# Patient Record
Sex: FEMALE | Race: WHITE | NOT HISPANIC OR LATINO | Employment: FULL TIME | URBAN - METROPOLITAN AREA
[De-identification: names, ages, dates, MRNs, and addresses within clinical notes are randomized per-mention and may not be internally consistent; named-entity substitution may affect disease eponyms.]

---

## 2024-02-05 ENCOUNTER — APPOINTMENT (OUTPATIENT)
Dept: OBSTETRICS AND GYNECOLOGY | Facility: CLINIC | Age: 24
End: 2024-02-05
Payer: MEDICAID

## 2024-02-19 ENCOUNTER — TELEMEDICINE (OUTPATIENT)
Dept: OBSTETRICS AND GYNECOLOGY | Facility: CLINIC | Age: 24
End: 2024-02-19
Payer: MEDICAID

## 2024-02-19 DIAGNOSIS — Z30.018 ENCOUNTER FOR INITIAL PRESCRIPTION OF OTHER CONTRACEPTIVES: Primary | ICD-10-CM

## 2024-02-19 PROCEDURE — 99213 OFFICE O/P EST LOW 20 MIN: CPT | Performed by: OBSTETRICS & GYNECOLOGY

## 2024-02-19 NOTE — PROGRESS NOTES
Subjective   Patient ID: Mayra Coronado is a 23 y.o. female who presents for contraception    23-year-old female presents to discuss contraception.  Patient had previously been on OCP but was having some issues remembering to take it so she wanted to switch to something a little bit more effective.  Patient was considering IUD and wanted to know a little bit more information about it.  She is in stable relationship for a number of years and does not want children for a number of years.        Review of Systems   Constitutional:  Negative for chills, diaphoresis and fever.   Genitourinary:  Negative for menstrual problem, pelvic pain and vaginal bleeding.       Objective   Physical Exam  Constitutional:       Appearance: Normal appearance.   Neurological:      Mental Status: She is alert.         Assessment/Plan   Problem List Items Addressed This Visit    None  Visit Diagnoses         Codes    Encounter for initial prescription of other contraceptives    -  Primary Z30.018          Desires Kyleena: I discussed that IUDs have an unintended pregnancy rate of approximately 1%, similar to permanent sterilization. Mechanism of action and duration of therapy was discussed. Expected side effects of pain and irregular spotting for the first 3-6 months were reviewed. We talked about risks such as expulsion, migration, perforation, and PID with untreated gonorrhea/chlamydia at the time of insertion. STI testing prior to IUD insertion was offered. Safe sexual practices were encouraged. All questions were answered.    --Patient will need Cytotec preprocedurally, placed when patient is on menses day 5 through 7 with negative UPT.    Aarti Kebede MD 02/19/24 11:55 AM

## 2024-02-22 ASSESSMENT — ENCOUNTER SYMPTOMS
DIAPHORESIS: 0
FEVER: 0
CHILLS: 0

## 2024-06-17 ENCOUNTER — OFFICE VISIT (OUTPATIENT)
Dept: OBSTETRICS AND GYNECOLOGY | Facility: CLINIC | Age: 24
End: 2024-06-17
Payer: MEDICAID

## 2024-06-17 VITALS
DIASTOLIC BLOOD PRESSURE: 59 MMHG | HEIGHT: 64 IN | BODY MASS INDEX: 29.53 KG/M2 | WEIGHT: 173 LBS | SYSTOLIC BLOOD PRESSURE: 91 MMHG

## 2024-06-17 DIAGNOSIS — Z72.51 HIGH RISK HETEROSEXUAL BEHAVIOR: ICD-10-CM

## 2024-06-17 DIAGNOSIS — N93.0 POSTCOITAL BLEEDING: ICD-10-CM

## 2024-06-17 DIAGNOSIS — Z01.419 ENCOUNTER FOR ANNUAL ROUTINE GYNECOLOGICAL EXAMINATION: Primary | ICD-10-CM

## 2024-06-17 DIAGNOSIS — Z11.3 SCREEN FOR SEXUALLY TRANSMITTED DISEASES: ICD-10-CM

## 2024-06-17 PROCEDURE — 1036F TOBACCO NON-USER: CPT | Performed by: OBSTETRICS & GYNECOLOGY

## 2024-06-17 PROCEDURE — 87491 CHLMYD TRACH DNA AMP PROBE: CPT | Performed by: OBSTETRICS & GYNECOLOGY

## 2024-06-17 PROCEDURE — 99395 PREV VISIT EST AGE 18-39: CPT | Performed by: OBSTETRICS & GYNECOLOGY

## 2024-06-17 RX ORDER — HYDROXYZINE HYDROCHLORIDE 10 MG/1
TABLET, FILM COATED ORAL
COMMUNITY
Start: 2024-05-01

## 2024-06-17 RX ORDER — TRAZODONE HYDROCHLORIDE 50 MG/1
5 TABLET ORAL NIGHTLY PRN
COMMUNITY
Start: 2024-04-01

## 2024-06-17 RX ORDER — DULOXETIN HYDROCHLORIDE 60 MG/1
60 CAPSULE, DELAYED RELEASE ORAL DAILY
COMMUNITY

## 2024-06-17 ASSESSMENT — LIFESTYLE VARIABLES
HOW OFTEN DO YOU HAVE A DRINK CONTAINING ALCOHOL: MONTHLY OR LESS
HOW MANY STANDARD DRINKS CONTAINING ALCOHOL DO YOU HAVE ON A TYPICAL DAY: 1 OR 2
SKIP TO QUESTIONS 9-10: 1
HOW OFTEN DO YOU HAVE SIX OR MORE DRINKS ON ONE OCCASION: NEVER
AUDIT-C TOTAL SCORE: 1

## 2024-06-17 ASSESSMENT — ENCOUNTER SYMPTOMS
OCCASIONAL FEELINGS OF UNSTEADINESS: 0
LOSS OF SENSATION IN FEET: 0
ABDOMINAL PAIN: 0
VOMITING: 0
NAUSEA: 0
DYSURIA: 0
CHILLS: 0
DIZZINESS: 0
DEPRESSION: 0
COUGH: 0
COLOR CHANGE: 0
UNEXPECTED WEIGHT CHANGE: 0
SHORTNESS OF BREATH: 0
FATIGUE: 0
HEADACHES: 0
FEVER: 0

## 2024-06-17 ASSESSMENT — PATIENT HEALTH QUESTIONNAIRE - PHQ9
1. LITTLE INTEREST OR PLEASURE IN DOING THINGS: NOT AT ALL
2. FEELING DOWN, DEPRESSED OR HOPELESS: NOT AT ALL
SUM OF ALL RESPONSES TO PHQ9 QUESTIONS 1 & 2: 0

## 2024-06-17 ASSESSMENT — PAIN SCALES - GENERAL: PAINLEVEL: 0-NO PAIN

## 2024-06-17 NOTE — PROGRESS NOTES
"Annual  Subjective   Mayra Coronado is a 24 y.o. female who is here for a routine exam.     Complaints:   getting some spotting after intercouse, random times, has new partner  Periods: regular but w/some spotting   Dysmenorrhea:  none    Current contraception: condoms  History of abnormal Pap smear: no  History of abnormal mammogram: no      OB History          0    Para   0    Term   0       0    AB   0    Living   0         SAB   0    IAB   0    Ectopic   0    Multiple   0    Live Births   0                  Review of Systems   Constitutional:  Negative for chills, fatigue, fever and unexpected weight change.   Respiratory:  Negative for cough and shortness of breath.    Gastrointestinal:  Negative for abdominal pain, nausea and vomiting.   Genitourinary:  Positive for menstrual problem. Negative for dyspareunia, dysuria, pelvic pain and vaginal discharge.   Skin:  Negative for color change and rash.   Neurological:  Negative for dizziness and headaches.       Objective   BP 91/59   Ht 1.626 m (5' 4\")   Wt 78.5 kg (173 lb)   LMP 2024   BMI 29.70 kg/m²        General:   Alert and oriented, in no acute distress   Neck: Supple. No visible thyromegaly.    Breast/Axilla: Normal to palpation bilaterally without masses, skin changes, or nipple discharge.    Abdomen: Soft, non-tender, without masses or organomegaly   Vulva: Normal architecture without erythema, masses, or lesions.    Vagina: Normal mucosa without lesions, masses, or atrophy. No abnormal vaginal discharge.    Cervix: Normal without masses, lesions, or signs of cervicitis   Uterus: Normal, mobile, non-enlarged uterus   Adnexa: Normal without masses or lesions   Pelvic Floor normal   Psych Normal affect. Normal mood.      Assessment/Plan   Problem List Items Addressed This Visit    None  Visit Diagnoses         Codes    Encounter for annual routine gynecological examination    -  Primary Z01.419    Relevant Orders    THINPREP PAP    " Postcoital bleeding     N93.0    Relevant Orders    THINPREP PAP    Screen for sexually transmitted diseases     Z11.3    Relevant Orders    THINPREP PAP    High risk heterosexual behavior     Z72.51    Relevant Orders    THINPREP PAP            Routine annual    Pap due 2026 but having some postcoital spotting      Aarti Kebede MD

## 2024-06-19 LAB
C TRACH RRNA SPEC QL NAA+PROBE: NEGATIVE
N GONORRHOEA DNA SPEC QL PROBE+SIG AMP: NEGATIVE

## 2024-06-27 LAB
CYTOLOGY CMNT CVX/VAG CYTO-IMP: NORMAL
LAB AP HPV GENOTYPE QUESTION: YES
LAB AP HPV HR: NORMAL
LAB AP PAP ADDITIONAL TESTS: NORMAL
LABORATORY COMMENT REPORT: NORMAL
LABORATORY COMMENT REPORT: NORMAL
LMP START DATE: NORMAL
PATH REPORT.TOTAL CANCER: NORMAL

## 2024-12-09 ENCOUNTER — TELEPHONE (OUTPATIENT)
Dept: OBSTETRICS AND GYNECOLOGY | Facility: CLINIC | Age: 24
End: 2024-12-09
Payer: MEDICAID

## 2024-12-09 DIAGNOSIS — O26.859 SPOTTING IN PREGNANCY (HHS-HCC): Primary | ICD-10-CM

## 2024-12-09 NOTE — TELEPHONE ENCOUNTER
Est pt around 5wks LMP 11/04 calling with c/o red spotting x1wk. Denies cramping or pain. Ok to do some labs?

## 2024-12-10 ENCOUNTER — LAB (OUTPATIENT)
Dept: LAB | Facility: LAB | Age: 24
End: 2024-12-10
Payer: MEDICAID

## 2024-12-10 DIAGNOSIS — O26.859 SPOTTING IN PREGNANCY (HHS-HCC): ICD-10-CM

## 2024-12-10 LAB
ABO GROUP (TYPE) IN BLOOD: NORMAL
B-HCG SERPL-ACNC: ABNORMAL MIU/ML
RH FACTOR (ANTIGEN D): NORMAL

## 2024-12-10 PROCEDURE — 84702 CHORIONIC GONADOTROPIN TEST: CPT

## 2024-12-10 PROCEDURE — 36415 COLL VENOUS BLD VENIPUNCTURE: CPT

## 2024-12-10 PROCEDURE — 86901 BLOOD TYPING SEROLOGIC RH(D): CPT

## 2024-12-10 PROCEDURE — 86900 BLOOD TYPING SEROLOGIC ABO: CPT

## 2024-12-17 DIAGNOSIS — O26.859 SPOTTING IN PREGNANCY (HHS-HCC): Primary | ICD-10-CM

## 2024-12-19 ENCOUNTER — HOSPITAL ENCOUNTER (OUTPATIENT)
Dept: RADIOLOGY | Facility: HOSPITAL | Age: 24
Discharge: HOME | End: 2024-12-19
Payer: MEDICAID

## 2024-12-19 DIAGNOSIS — O26.859 SPOTTING IN PREGNANCY (HHS-HCC): ICD-10-CM

## 2024-12-19 PROCEDURE — 76817 TRANSVAGINAL US OBSTETRIC: CPT

## 2025-01-08 ENCOUNTER — INITIAL PRENATAL (OUTPATIENT)
Dept: OBSTETRICS AND GYNECOLOGY | Facility: CLINIC | Age: 25
End: 2025-01-08
Payer: MEDICAID

## 2025-01-08 VITALS
WEIGHT: 158.8 LBS | BODY MASS INDEX: 27.11 KG/M2 | DIASTOLIC BLOOD PRESSURE: 82 MMHG | SYSTOLIC BLOOD PRESSURE: 122 MMHG | OXYGEN SATURATION: 99 % | HEART RATE: 104 BPM | HEIGHT: 64 IN

## 2025-01-08 DIAGNOSIS — Z82.79 FAMILY HISTORY OF CONGENITAL HEART DEFECT: ICD-10-CM

## 2025-01-08 DIAGNOSIS — O36.80X0 PREGNANCY WITH INCONCLUSIVE FETAL VIABILITY, SINGLE OR UNSPECIFIED FETUS: ICD-10-CM

## 2025-01-08 DIAGNOSIS — F12.11 TETRAHYDROCANNABINOL (THC) USE DISORDER, MILD, IN EARLY REMISSION, ABUSE: ICD-10-CM

## 2025-01-08 DIAGNOSIS — Z34.90 PRENATAL CARE, ANTEPARTUM (HHS-HCC): ICD-10-CM

## 2025-01-08 DIAGNOSIS — Z23 ENCOUNTER FOR IMMUNIZATION: ICD-10-CM

## 2025-01-08 DIAGNOSIS — Z34.00 SUPERVISION OF NORMAL FIRST PREGNANCY, ANTEPARTUM (HHS-HCC): Primary | ICD-10-CM

## 2025-01-08 DIAGNOSIS — Z13.79 GENETIC TESTING: Primary | ICD-10-CM

## 2025-01-08 PROCEDURE — 99213 OFFICE O/P EST LOW 20 MIN: CPT | Mod: 25 | Performed by: OBSTETRICS & GYNECOLOGY

## 2025-01-08 PROCEDURE — 87086 URINE CULTURE/COLONY COUNT: CPT | Performed by: OBSTETRICS & GYNECOLOGY

## 2025-01-08 PROCEDURE — 99213 OFFICE O/P EST LOW 20 MIN: CPT | Performed by: OBSTETRICS & GYNECOLOGY

## 2025-01-08 PROCEDURE — 87491 CHLMYD TRACH DNA AMP PROBE: CPT | Performed by: OBSTETRICS & GYNECOLOGY

## 2025-01-08 PROCEDURE — 90656 IIV3 VACC NO PRSV 0.5 ML IM: CPT | Performed by: OBSTETRICS & GYNECOLOGY

## 2025-01-08 PROCEDURE — 81003 URINALYSIS AUTO W/O SCOPE: CPT | Performed by: OBSTETRICS & GYNECOLOGY

## 2025-01-08 ASSESSMENT — LIFESTYLE VARIABLES
SKIP TO QUESTIONS 9-10: 1
HOW OFTEN DO YOU HAVE A DRINK CONTAINING ALCOHOL: NEVER
HOW OFTEN DO YOU HAVE SIX OR MORE DRINKS ON ONE OCCASION: NEVER
AUDIT-C TOTAL SCORE: 0
HOW MANY STANDARD DRINKS CONTAINING ALCOHOL DO YOU HAVE ON A TYPICAL DAY: PATIENT DOES NOT DRINK

## 2025-01-08 ASSESSMENT — SOCIAL DETERMINANTS OF HEALTH (SDOH)
WITHIN THE LAST YEAR, HAVE YOU BEEN HUMILIATED OR EMOTIONALLY ABUSED IN OTHER WAYS BY YOUR PARTNER OR EX-PARTNER?: NO
WITHIN THE LAST YEAR, HAVE YOU BEEN KICKED, HIT, SLAPPED, OR OTHERWISE PHYSICALLY HURT BY YOUR PARTNER OR EX-PARTNER?: NO
WITHIN THE LAST YEAR, HAVE YOU BEEN AFRAID OF YOUR PARTNER OR EX-PARTNER?: NO
WITHIN THE LAST YEAR, HAVE TO BEEN RAPED OR FORCED TO HAVE ANY KIND OF SEXUAL ACTIVITY BY YOUR PARTNER OR EX-PARTNER?: NO

## 2025-01-08 ASSESSMENT — PATIENT HEALTH QUESTIONNAIRE - PHQ9
2. FEELING DOWN, DEPRESSED OR HOPELESS: NOT AT ALL
SUM OF ALL RESPONSES TO PHQ9 QUESTIONS 1 & 2: 0
1. LITTLE INTEREST OR PLEASURE IN DOING THINGS: NOT AT ALL

## 2025-01-08 ASSESSMENT — PAIN SCALES - GENERAL: PAINLEVEL_OUTOF10: 0 - NO PAIN

## 2025-01-08 ASSESSMENT — PAIN - FUNCTIONAL ASSESSMENT: PAIN_FUNCTIONAL_ASSESSMENT: 0-10

## 2025-01-08 ASSESSMENT — ENCOUNTER SYMPTOMS
DEPRESSION: 0
LOSS OF SENSATION IN FEET: 0
OCCASIONAL FEELINGS OF UNSTEADINESS: 0

## 2025-01-08 NOTE — PROGRESS NOTES
"Subjective   Patient ID [id number]   Mayra Coronado is a 24 y.o.  at 9w1d with a working estimated date of delivery of 2025, by Last Menstrual Period who presents for an initial prenatal visit.     Her pregnancy is complicated by:  FOB sister w/\"hole in heart\", fetal echo at 22wks    OB History    Para Term  AB Living   1 0 0 0 0 0   SAB IAB Ectopic Multiple Live Births   0 0 0 0 0      # Outcome Date GA Lbr Robert/2nd Weight Sex Type Anes PTL Lv   1 Current                   Objective   Physical Exam  Weight: 72 kg (158 lb 12.8 oz)  Pregravid BMI: Could not be calculated  BP: 122/82          PROCEDURE:  OB/GYN Transvaginal U/S    Intrauterine - yes  Yolk Sac - yes  Fetal Pole- single  FHT  yes  EDC  2025  CRL  9w3d  Impression: single live IUP, adequate growth since previous US     OBGyn Exam    General Physical Exam    HEENT: normal Heart: normal Skin: normal   Thyroid: normal Lungs: normal Extremities: normal   Lymph Nodes: normal Breasts: normal Neurological: normal   Abdomen: normal        Pelvic Exam    Vulva: normal Vagina: normal   Cervix: normal Adnexa: normal   Rectum: normal Spines: average   Subpubic Arch: normal       Prenatal Labs  Results for orders placed or performed in visit on 25   Urine Culture clinic collect    Collection Time: 25  3:03 PM    Specimen: Clean Catch/Voided; Urine   Result Value Ref Range    Urine Culture       Clinically insignificant growth based on current clinical standards.   Urine GC / Chlam clinic collect    Collection Time: 25  3:03 PM   Result Value Ref Range    Neisseria gonorrhea,Amplified Negative Negative    Chlamydia trachomatis, Amplified Negative Negative   POCT UA Automated manually resulted    Collection Time: 25  4:31 PM   Result Value Ref Range    POC Color, Urine Yellow Straw, Yellow, Light-Yellow    POC Appearance, Urine Clear Clear    POC Glucose, Urine NEGATIVE NEGATIVE mg/dl    POC Bilirubin, Urine NEGATIVE " NEGATIVE    POC Ketones, Urine NEGATIVE NEGATIVE mg/dl    POC Specific Gravity, Urine 1.025 1.005 - 1.035    POC Blood, Urine NEGATIVE NEGATIVE    POC PH, Urine 7.5 No Reference Range Established PH    POC Protein, Urine NEGATIVE NEGATIVE mg/dl    POC Urobilinogen, Urine 0.2 0.2, 1.0 EU/DL    Poc Nitrite, Urine NEGATIVE NEGATIVE    POC Leukocytes, Urine SMALL (1+) (A) NEGATIVE         Assessment/Plan   Problem List Items Addressed This Visit    None  Visit Diagnoses         Codes    Supervision of normal first pregnancy, antepartum ([facility])    -  Primary Z34.00    Genetic testing     Z13.79    Relevant Orders    [facility] P[facility]el Prenatal screen    Myriad Foresight Carrier Screen    Prenatal care, antepartum ([facility])     Z34.90    Relevant Orders    CBC Anemia Panel With Reflex,Pregnancy    Hepatitis B surface antigen    Hepatitis C antibody    HIV 1/2 Antigen/Antibody Screen with Reflex to Confirmation    Rubella Antibody, IgG    Syphilis Screen with Reflex    Hgb  A1C    Type And Screen    Urine Culture clinic collect (Completed)    Urine GC / Chlam clinic collect (Completed)    Varicella Zoster Antibody, IgG    US OB NT (NUCHAL translucency)    US MAC OB imaging order    POCT UA Automated manually resulted (Completed)    Encounter for immunization     Z23    Relevant Orders    Flu vaccine, trivalent, preservative free, age 6 months and greater (Fluarix/Fluzone/Flulaval) (Completed)    Pregnancy with inconclusive fetal viability, single or unspecified fetus     O36.80X0    Relevant Orders    Point of Care Ultrasound    Tetrahydrocannabinol (THC) use disorder, mild, in early remission, abuse     F12.11    Relevant Orders    Drug Screen, Urine With Reflex to Confirmation    Family history of congenital heart defect     Z82.79    Relevant Orders    Fetal Echo Complete            Immunizations: discussed, accepts flu vaccine  Prenatal Labs ordered  Daily prenatal vitamins prescribed  First trimester screening and  second trimester screening discussed.  Follow up in 4 weeks for return OB visit.

## 2025-01-09 LAB
C TRACH RRNA SPEC QL NAA+PROBE: NEGATIVE
N GONORRHOEA DNA SPEC QL PROBE+SIG AMP: NEGATIVE
POC APPEARANCE, URINE: CLEAR
POC BILIRUBIN, URINE: NEGATIVE
POC BLOOD, URINE: NEGATIVE
POC COLOR, URINE: YELLOW
POC GLUCOSE, URINE: NEGATIVE MG/DL
POC KETONES, URINE: NEGATIVE MG/DL
POC LEUKOCYTES, URINE: ABNORMAL
POC NITRITE,URINE: NEGATIVE
POC PH, URINE: 7.5 PH
POC PROTEIN, URINE: NEGATIVE MG/DL
POC SPECIFIC GRAVITY, URINE: 1.02
POC UROBILINOGEN, URINE: 0.2 EU/DL

## 2025-01-10 LAB — BACTERIA UR CULT: NORMAL

## 2025-01-14 ENCOUNTER — LAB (OUTPATIENT)
Dept: LAB | Facility: LAB | Age: 25
End: 2025-01-14
Payer: MEDICAID

## 2025-01-14 DIAGNOSIS — F12.11 TETRAHYDROCANNABINOL (THC) USE DISORDER, MILD, IN EARLY REMISSION, ABUSE: ICD-10-CM

## 2025-01-14 DIAGNOSIS — Z34.90 PRENATAL CARE, ANTEPARTUM (HHS-HCC): ICD-10-CM

## 2025-01-14 LAB
ABO GROUP (TYPE) IN BLOOD: NORMAL
AMPHETAMINES UR QL SCN: ABNORMAL
ANTIBODY SCREEN: NORMAL
BARBITURATES UR QL SCN: ABNORMAL
BENZODIAZ UR QL SCN: ABNORMAL
BZE UR QL SCN: ABNORMAL
CANNABINOIDS UR QL SCN: ABNORMAL
ERYTHROCYTE [DISTWIDTH] IN BLOOD BY AUTOMATED COUNT: 13.3 % (ref 11.5–14.5)
FENTANYL+NORFENTANYL UR QL SCN: ABNORMAL
HCT VFR BLD AUTO: 43.6 % (ref 36–46)
HGB BLD-MCNC: 14.6 G/DL (ref 12–16)
MCH RBC QN AUTO: 29.9 PG (ref 26–34)
MCHC RBC AUTO-ENTMCNC: 33.5 G/DL (ref 32–36)
MCV RBC AUTO: 89 FL (ref 80–100)
METHADONE UR QL SCN: ABNORMAL
NRBC BLD-RTO: 0 /100 WBCS (ref 0–0)
OPIATES UR QL SCN: ABNORMAL
OXYCODONE+OXYMORPHONE UR QL SCN: ABNORMAL
PCP UR QL SCN: ABNORMAL
PLATELET # BLD AUTO: 359 X10*3/UL (ref 150–450)
RBC # BLD AUTO: 4.89 X10*6/UL (ref 4–5.2)
RH FACTOR (ANTIGEN D): NORMAL
WBC # BLD AUTO: 9.3 X10*3/UL (ref 4.4–11.3)

## 2025-01-14 PROCEDURE — 85027 COMPLETE CBC AUTOMATED: CPT

## 2025-01-14 PROCEDURE — 86787 VARICELLA-ZOSTER ANTIBODY: CPT

## 2025-01-14 PROCEDURE — 86803 HEPATITIS C AB TEST: CPT

## 2025-01-14 PROCEDURE — 86317 IMMUNOASSAY INFECTIOUS AGENT: CPT

## 2025-01-14 PROCEDURE — 87340 HEPATITIS B SURFACE AG IA: CPT

## 2025-01-14 PROCEDURE — 80349 CANNABINOIDS NATURAL: CPT

## 2025-01-14 PROCEDURE — 87389 HIV-1 AG W/HIV-1&-2 AB AG IA: CPT

## 2025-01-14 PROCEDURE — 86901 BLOOD TYPING SEROLOGIC RH(D): CPT

## 2025-01-14 PROCEDURE — 80307 DRUG TEST PRSMV CHEM ANLYZR: CPT

## 2025-01-14 PROCEDURE — 83036 HEMOGLOBIN GLYCOSYLATED A1C: CPT

## 2025-01-14 PROCEDURE — 86850 RBC ANTIBODY SCREEN: CPT

## 2025-01-14 PROCEDURE — 86780 TREPONEMA PALLIDUM: CPT

## 2025-01-14 PROCEDURE — 86900 BLOOD TYPING SEROLOGIC ABO: CPT

## 2025-01-15 LAB
COMMENTS - MP RESULT TYPE: NORMAL
COMMENTS - MP RESULT TYPE: NORMAL
EST. AVERAGE GLUCOSE BLD GHB EST-MCNC: 77 MG/DL
HBA1C MFR BLD: 4.3 %
HBV SURFACE AG SERPL QL IA: NONREACTIVE
HCV AB SER QL: NONREACTIVE
HIV 1+2 AB+HIV1 P24 AG SERPL QL IA: NONREACTIVE
REFLEX ADDED, ANEMIA PANEL: NORMAL
RUBV IGG SERPL IA-ACNC: 1.7 IA
RUBV IGG SERPL QL IA: POSITIVE
SCAN RESULT: NORMAL
SCAN RESULT: NORMAL
TREPONEMA PALLIDUM IGG+IGM AB [PRESENCE] IN SERUM OR PLASMA BY IMMUNOASSAY: NONREACTIVE
VARICELLA ZOSTER IGG INDEX: 3.3 IA
VZV IGG SER QL IA: POSITIVE

## 2025-01-17 ENCOUNTER — TELEPHONE (OUTPATIENT)
Dept: OBSTETRICS AND GYNECOLOGY | Facility: HOSPITAL | Age: 25
End: 2025-01-17
Payer: MEDICAID

## 2025-01-20 LAB — CARBOXYTHC UR-MCNC: 124 NG/ML

## 2025-01-21 ENCOUNTER — TELEPHONE (OUTPATIENT)
Dept: OBSTETRICS AND GYNECOLOGY | Facility: HOSPITAL | Age: 25
End: 2025-01-21
Payer: MEDICAID

## 2025-01-21 NOTE — TELEPHONE ENCOUNTER
Attempted to call pt to schedule fetal echo. No answer, left message. Awaiting return call from pt.

## 2025-01-29 ENCOUNTER — APPOINTMENT (OUTPATIENT)
Dept: RADIOLOGY | Facility: HOSPITAL | Age: 25
End: 2025-01-29
Payer: MEDICAID

## 2025-02-06 ENCOUNTER — ROUTINE PRENATAL (OUTPATIENT)
Dept: OBSTETRICS AND GYNECOLOGY | Facility: CLINIC | Age: 25
End: 2025-02-06
Payer: MEDICAID

## 2025-02-06 VITALS
SYSTOLIC BLOOD PRESSURE: 113 MMHG | BODY MASS INDEX: 27.39 KG/M2 | OXYGEN SATURATION: 99 % | HEART RATE: 110 BPM | DIASTOLIC BLOOD PRESSURE: 75 MMHG | HEIGHT: 64 IN | WEIGHT: 160.4 LBS

## 2025-02-06 DIAGNOSIS — Z3A.13 13 WEEKS GESTATION OF PREGNANCY (HHS-HCC): ICD-10-CM

## 2025-02-06 DIAGNOSIS — Z34.00 SUPERVISION OF NORMAL FIRST PREGNANCY, ANTEPARTUM (HHS-HCC): Primary | ICD-10-CM

## 2025-02-06 LAB
POC APPEARANCE, URINE: CLEAR
POC BILIRUBIN, URINE: NEGATIVE
POC BLOOD, URINE: NEGATIVE
POC COLOR, URINE: YELLOW
POC GLUCOSE, URINE: NEGATIVE MG/DL
POC KETONES, URINE: NEGATIVE MG/DL
POC LEUKOCYTES, URINE: NEGATIVE
POC NITRITE,URINE: NEGATIVE
POC PH, URINE: 7.5 PH
POC PROTEIN, URINE: NEGATIVE MG/DL
POC SPECIFIC GRAVITY, URINE: 1.02
POC UROBILINOGEN, URINE: 0.2 EU/DL

## 2025-02-06 PROCEDURE — 99213 OFFICE O/P EST LOW 20 MIN: CPT | Mod: TH | Performed by: OBSTETRICS & GYNECOLOGY

## 2025-02-06 PROCEDURE — 99213 OFFICE O/P EST LOW 20 MIN: CPT | Performed by: OBSTETRICS & GYNECOLOGY

## 2025-02-06 PROCEDURE — 81003 URINALYSIS AUTO W/O SCOPE: CPT | Mod: QW | Performed by: OBSTETRICS & GYNECOLOGY

## 2025-02-06 ASSESSMENT — PAIN - FUNCTIONAL ASSESSMENT: PAIN_FUNCTIONAL_ASSESSMENT: 0-10

## 2025-02-06 ASSESSMENT — ENCOUNTER SYMPTOMS
OCCASIONAL FEELINGS OF UNSTEADINESS: 0
DEPRESSION: 0
LOSS OF SENSATION IN FEET: 0

## 2025-02-06 ASSESSMENT — PATIENT HEALTH QUESTIONNAIRE - PHQ9
1. LITTLE INTEREST OR PLEASURE IN DOING THINGS: NOT AT ALL
SUM OF ALL RESPONSES TO PHQ9 QUESTIONS 1 & 2: 0
2. FEELING DOWN, DEPRESSED OR HOPELESS: NOT AT ALL

## 2025-02-06 ASSESSMENT — LIFESTYLE VARIABLES
SKIP TO QUESTIONS 9-10: 1
HOW OFTEN DO YOU HAVE SIX OR MORE DRINKS ON ONE OCCASION: NEVER
AUDIT-C TOTAL SCORE: 0
HOW MANY STANDARD DRINKS CONTAINING ALCOHOL DO YOU HAVE ON A TYPICAL DAY: PATIENT DOES NOT DRINK
HOW OFTEN DO YOU HAVE A DRINK CONTAINING ALCOHOL: NEVER

## 2025-02-06 ASSESSMENT — PAIN SCALES - GENERAL: PAINLEVEL_OUTOF10: 0 - NO PAIN

## 2025-02-06 NOTE — PROGRESS NOTES
"Subjective   Patient ID [id number]   Mayra Coronado is a 24 y.o.  at 13w2d  with a working estimated date of delivery of 2025, by Last Menstrual Period who presents for a routine prenatal visit.     Her pregnancy is complicated by:  FOB sister w/\"hole in heart\", fetal echo at 22wks     Objective   Physical Exam  Weight: 72.8 kg (160 lb 6.4 oz), Pregravid BMI: 27.11  BP: 113/75  Fetal Heart Rate: 145               Prenatal Labs  Urine dip:  Results for orders placed or performed in visit on 25   POCT UA Automated manually resulted    Collection Time: 25 10:54 AM   Result Value Ref Range    POC Color, Urine Yellow Straw, Yellow, Light-Yellow    POC Appearance, Urine Clear Clear    POC Glucose, Urine NEGATIVE NEGATIVE mg/dl    POC Bilirubin, Urine NEGATIVE NEGATIVE    POC Ketones, Urine NEGATIVE NEGATIVE mg/dl    POC Specific Gravity, Urine 1.025 1.005 - 1.035    POC Blood, Urine NEGATIVE NEGATIVE    POC PH, Urine 7.5 No Reference Range Established PH    POC Protein, Urine NEGATIVE NEGATIVE mg/dl    POC Urobilinogen, Urine 0.2 0.2, 1.0 EU/DL    Poc Nitrite, Urine NEGATIVE NEGATIVE    POC Leukocytes, Urine NEGATIVE NEGATIVE         Assessment/Plan   Problem List Items Addressed This Visit    None  Visit Diagnoses         Codes    Supervision of normal first pregnancy, antepartum ([facility])    -  Primary Z34.00    Relevant Orders    POCT UA Automated manually resulted (Completed)    US OB detail fetal anatomy    13 weeks gestation of pregnancy ([facility])     Z3A.13            Continue prenatal vitamin.  Labs reviewed.  Follow up in 4 weeks for a routine prenatal visit.  "

## 2025-03-03 ENCOUNTER — ROUTINE PRENATAL (OUTPATIENT)
Dept: OBSTETRICS AND GYNECOLOGY | Facility: CLINIC | Age: 25
End: 2025-03-03
Payer: MEDICAID

## 2025-03-03 VITALS
SYSTOLIC BLOOD PRESSURE: 114 MMHG | BODY MASS INDEX: 28.79 KG/M2 | HEIGHT: 64 IN | WEIGHT: 168.6 LBS | OXYGEN SATURATION: 97 % | HEART RATE: 101 BPM | DIASTOLIC BLOOD PRESSURE: 75 MMHG

## 2025-03-03 DIAGNOSIS — Z34.00 SUPERVISION OF NORMAL FIRST PREGNANCY, ANTEPARTUM (HHS-HCC): Primary | ICD-10-CM

## 2025-03-03 DIAGNOSIS — Z3A.16 16 WEEKS GESTATION OF PREGNANCY (HHS-HCC): ICD-10-CM

## 2025-03-03 LAB
POC APPEARANCE, URINE: CLEAR
POC BILIRUBIN, URINE: NEGATIVE
POC BLOOD, URINE: NEGATIVE
POC COLOR, URINE: YELLOW
POC GLUCOSE, URINE: NEGATIVE MG/DL
POC KETONES, URINE: NEGATIVE MG/DL
POC LEUKOCYTES, URINE: ABNORMAL
POC NITRITE,URINE: NEGATIVE
POC PH, URINE: 7.5 PH
POC PROTEIN, URINE: NEGATIVE MG/DL
POC SPECIFIC GRAVITY, URINE: 1.02
POC UROBILINOGEN, URINE: 1 EU/DL

## 2025-03-03 PROCEDURE — 99213 OFFICE O/P EST LOW 20 MIN: CPT | Performed by: OBSTETRICS & GYNECOLOGY

## 2025-03-03 PROCEDURE — 81003 URINALYSIS AUTO W/O SCOPE: CPT | Performed by: OBSTETRICS & GYNECOLOGY

## 2025-03-03 PROCEDURE — 99213 OFFICE O/P EST LOW 20 MIN: CPT | Mod: TH | Performed by: OBSTETRICS & GYNECOLOGY

## 2025-03-03 ASSESSMENT — LIFESTYLE VARIABLES
HOW OFTEN DO YOU HAVE A DRINK CONTAINING ALCOHOL: NEVER
HOW OFTEN DO YOU HAVE SIX OR MORE DRINKS ON ONE OCCASION: NEVER
SKIP TO QUESTIONS 9-10: 1
HOW MANY STANDARD DRINKS CONTAINING ALCOHOL DO YOU HAVE ON A TYPICAL DAY: PATIENT DOES NOT DRINK
AUDIT-C TOTAL SCORE: 0

## 2025-03-03 ASSESSMENT — ENCOUNTER SYMPTOMS
OCCASIONAL FEELINGS OF UNSTEADINESS: 0
LOSS OF SENSATION IN FEET: 0
DEPRESSION: 0

## 2025-03-03 ASSESSMENT — PAIN SCALES - GENERAL: PAINLEVEL_OUTOF10: 0 - NO PAIN

## 2025-03-03 ASSESSMENT — PAIN - FUNCTIONAL ASSESSMENT: PAIN_FUNCTIONAL_ASSESSMENT: 0-10

## 2025-03-03 NOTE — PROGRESS NOTES
"Subjective   Patient ID [id number]   Mayra Coronado is a 24 y.o.  at 16w6d with a working estimated date of delivery of 2025, by Last Menstrual Period who presents for a routine prenatal visit.     Her pregnancy is complicated by:  FOB sister w/\"hole in heart\", fetal echo at 22wks   Initial UDS +THC, pt already stopped, repeat at 36wks    Objective   Physical Exam  Weight: 76.5 kg (168 lb 9.6 oz)  Pregravid BMI: 27.11  BP: 114/75  Fetal Heart Rate: 145               Prenatal Labs  Urine dip:  Results for orders placed or performed in visit on 25   POCT UA Automated manually resulted    Collection Time: 25  2:35 PM   Result Value Ref Range    POC Color, Urine Yellow Straw, Yellow, Light-Yellow    POC Appearance, Urine Clear Clear    POC Glucose, Urine NEGATIVE NEGATIVE mg/dl    POC Bilirubin, Urine NEGATIVE NEGATIVE    POC Ketones, Urine NEGATIVE NEGATIVE mg/dl    POC Specific Gravity, Urine 1.020 1.005 - 1.035    POC Blood, Urine NEGATIVE NEGATIVE    POC PH, Urine 7.5 No Reference Range Established PH    POC Protein, Urine NEGATIVE NEGATIVE mg/dl    POC Urobilinogen, Urine 1.0 0.2, 1.0 EU/DL    Poc Nitrite, Urine NEGATIVE NEGATIVE    POC Leukocytes, Urine TRACE (A) NEGATIVE       Assessment/Plan   Problem List Items Addressed This Visit    None  Visit Diagnoses         Codes    Supervision of normal first pregnancy, antepartum ([facility])    -  Primary Z34.00    Relevant Orders    POCT UA Automated manually resulted (Completed)    16 weeks gestation of pregnancy ([facility])     Z3A.16            Continue prenatal vitamin.  Labs reviewed.  Schedule anatomy ultrasound.  Follow up in 4 weeks for a routine prenatal visit.  "

## 2025-03-13 ENCOUNTER — APPOINTMENT (OUTPATIENT)
Dept: PEDIATRIC CARDIOLOGY | Facility: CLINIC | Age: 25
End: 2025-03-13
Payer: MEDICAID

## 2025-03-13 VITALS
WEIGHT: 168.21 LBS | BODY MASS INDEX: 28.72 KG/M2 | HEIGHT: 64 IN | SYSTOLIC BLOOD PRESSURE: 119 MMHG | HEART RATE: 103 BPM | DIASTOLIC BLOOD PRESSURE: 75 MMHG

## 2025-03-13 DIAGNOSIS — O35.8XX0 MATERNAL CARE FOR OTHER (SUSPECTED) FETAL ABNORMALITY AND DAMAGE, NOT APPLICABLE OR UNSPECIFIED (HHS-HCC): ICD-10-CM

## 2025-03-13 DIAGNOSIS — O28.3 ABNORMAL FETAL ULTRASOUND: ICD-10-CM

## 2025-03-13 DIAGNOSIS — O35.BXX0 ABNORMAL FETAL ECHOCARDIOGRAPHY AFFECTING ANTEPARTUM CARE OF MOTHER, SINGLE OR UNSPECIFIED FETUS (HHS-HCC): Primary | ICD-10-CM

## 2025-03-13 PROCEDURE — 76827 ECHO EXAM OF FETAL HEART: CPT | Performed by: PEDIATRICS

## 2025-03-13 PROCEDURE — 99244 OFF/OP CNSLTJ NEW/EST MOD 40: CPT | Performed by: PEDIATRICS

## 2025-03-13 PROCEDURE — 3008F BODY MASS INDEX DOCD: CPT | Performed by: PEDIATRICS

## 2025-03-13 PROCEDURE — 76825 ECHO EXAM OF FETAL HEART: CPT | Performed by: PEDIATRICS

## 2025-03-13 PROCEDURE — 93325 DOPPLER ECHO COLOR FLOW MAPG: CPT | Performed by: PEDIATRICS

## 2025-03-13 NOTE — PROGRESS NOTES
Mayra Coronado was seen at the request of Aarti Kebede for a chief complaint of FOB with a sister with heart murmur; a report with my findings is being sent via written or electronic means the referring physician with my recommendations for treatment.     I had the pleasure of seeing Mayra Coronado in Pediatric Cardiology consultation at our [facility] location as part of our prenatal heart program for FOB with a sister with heart murmur.  She is a 24 y.o. year-old  woman, currently 18w2d weeks gestation. Patient's last menstrual period was 2024. Estimated Date of Delivery: 25.  There have been no pregnancy complications.   She has not been hospitalized during this pregnancy.  She had a NIPT, which was normal.  She has not yet had a second trimester ultrasound.      Her previous obstetrical history is without complication.  Her past medical history is significant for anxiety and depression.  She has no history of congenital heart disease, arrhythmia, cardiomyopathy, hypercholesterolemia, hypertension, diabetes, rheumatic heart disease, cancer, asthma, lupus, Sjogren syndrome, clotting disorder, alcohol abuse, phenylketonuria, or DiGeorge.  She has had no surgeries.  She takes Cymbalta.  She is allergic to adhesive tape-silicones and amoxicillin. She is currently taking prenatal vitamins.      Her family history is negative for congenital heart disease, early atherosclerosis, sudden cardiac death, long QT syndrome, cardiomyopathy, aortic aneurysm, or genetic or metabolic disease.  Per patient report, KEZIA's sister was born with a heart murmur but did not require surgery or medications and does not see cardiology.    She currently lives alone.  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  She denies illicit drug use or alcohol abuse.  She denies verbal, sexual, or physical abuse.     Delivery Hospital: [facility]  Father of the baby's name: Ezequiel    /75 (BP Location: Right arm, Patient  "Position: Sitting)   Pulse 103   Ht 1.637 m (5' 4.45\")   Wt 76.3 kg (168 lb 3.4 oz)   LMP 2024   BMI 28.47 kg/m²     She was resting comfortably in the examination room and alert, active and in no respiratory distress. Skin was without rash.  HEENT: moist mucous membranes, no JVD, goiter. Breathing is not labored.  She was acyanotic.  There was no peripheral edema.   The abdomen was gravid, soft, nontender with normal bowel sounds.  The liver was not palpable.  The spleen tip was not palpable.  She had a normal gait and normal strength in all extremities.  Cranial nerves II - XII are intact.  She had no clubbing, cyanosis, or edema.    A two-dimensional and Doppler fetal echocardiogram was performed today and interpreted by me at 18w2d weeks gestation.  The fetal echocardiogram showed normal segmental anatomy with no structural abnormalities found.  There is normal cardiac function.  There is no evidence of septation defect, right or left ventricular outflow obstruction or significant valvular regurgitation.  The fetal heart rate was within normal limits without ectopy or arrhythmia seen.  The spectral Doppler pattern across all valves, venous structures, and arterial structures was within normal limits.  There is no pericardial effusion.  Please see full report for details.    In summary, Mayra Coronado is a 24 y.o. year-old  woman, currently 18w2d weeks gestation, who had a normal fetal echocardiogram at today's visit.  Therefore, we did not make any changes to her current delivery plan.  We did not prescribe any medications.  We did not recommend intervention.  She does not necessarily need to follow up with pediatric cardiology after the baby is born unless the pediatric team has any concerns or worries.     According to the latest guidelines for fetal echocardiogram from the American Society of Echocardiogram, a family history of CHD in a 2nd degree relative is no longer an indication for fetal " echo.  For future pregnancies, with a somewhat vague history of a murmur in the FOB's sister, fetal echocardiogram would only be warranted for concerns on detailed anatomy scanning.    LOC: 0  Normal fetal echocardiogram within the limitations of ultrasound. No changes were made to current delivery plan. Triage code 0:  Delivery per OB at patient's preferred hospital.  Standard  care per  team.  Cardiology consult not necessary, unless there are clinical concerns.       Scribe’s statement: I, Janna Arzola CNP, am scribing for, and in the presence of, Dr. Sam.    Thank you for allowing me to participate in Mayra's care.  If you have any further questions, please do not hesitate to contact me.     Rinku Sam M.D.  [facility], Director  Ambulatory Pediatric Cardiology   Division of Pediatric Cardiology  [place]  [facility]   of Pediatrics, [facility]  [facility] [address] [place] [address]  [place] [id number]  Office:  [phone]  Fax:       [phone]  e-mail:  [email or web address]    I spent greater than 45 minutes in performance of this consultation, of which greater than 50% was related to coordination of care or counseling.

## 2025-03-13 NOTE — LETTER
2025     Aarti Kebede MD  [address]  [place] [address]    Patient: Mayra Coronado   YOB: 2000   Date of Visit: 3/13/2025       Dear Dr. Aarti Kebede MD:    Thank you for referring Mayra Coronado to me for evaluation. Below are my notes for this consultation.  If you have questions, please do not hesitate to call me. I look forward to following your patient along with you.       Sincerely,     Rinku Sam MD      CC: Amairani Zavala, APRN-CN, [facility]  Janna Arzola, APRN-CNP  ______________________________________________________________________________________    Mayra Coronado was seen at the request of Aarti Kebede for a chief complaint of FOB with a sister with heart murmur; a report with my findings is being sent via written or electronic means the referring physician with my recommendations for treatment.     I had the pleasure of seeing Mayra Coronado in Pediatric Cardiology consultation at our [facility] location as part of our prenatal heart program for FOB with a sister with heart murmur.  She is a 24 y.o. year-old  woman, currently 18w2d weeks gestation. Patient's last menstrual period was 2024. Estimated Date of Delivery: 25.  There have been no pregnancy complications.   She has not been hospitalized during this pregnancy.  She had a NIPT, which was normal.  She has not yet had a second trimester ultrasound.      Her previous obstetrical history is without complication.  Her past medical history is significant for anxiety and depression.  She has no history of congenital heart disease, arrhythmia, cardiomyopathy, hypercholesterolemia, hypertension, diabetes, rheumatic heart disease, cancer, asthma, lupus, Sjogren syndrome, clotting disorder, alcohol abuse, phenylketonuria, or DiGeorge.  She has had no surgeries.  She takes Cymbalta.  She is allergic to adhesive tape-silicones and amoxicillin. She is currently taking prenatal  "vitamins.      Her family history is negative for congenital heart disease, early atherosclerosis, sudden cardiac death, long QT syndrome, cardiomyopathy, aortic aneurysm, or genetic or metabolic disease.  Per patient report, KEZIA's sister was born with a heart murmur but did not require surgery or medications and does not see cardiology.    She currently lives alone.  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  She denies illicit drug use or alcohol abuse.  She denies verbal, sexual, or physical abuse.     Delivery Hospital: [facility]  Father of the baby's name: Ezequiel    /75 (BP Location: Right arm, Patient Position: Sitting)   Pulse 103   Ht 1.637 m (5' 4.45\")   Wt 76.3 kg (168 lb 3.4 oz)   LMP 11/05/2024   BMI 28.47 kg/m²     She was resting comfortably in the examination room and alert, active and in no respiratory distress. Skin was without rash.  HEENT: moist mucous membranes, no JVD, goiter. Breathing is not labored.  She was acyanotic.  There was no peripheral edema.   The abdomen was gravid, soft, nontender with normal bowel sounds.  The liver was not palpable.  The spleen tip was not palpable.  She had a normal gait and normal strength in all extremities.  Cranial nerves II - XII are intact.  She had no clubbing, cyanosis, or edema.    A two-dimensional and Doppler fetal echocardiogram was performed today and interpreted by me at 18w2d weeks gestation.  The fetal echocardiogram showed normal segmental anatomy with no structural abnormalities found.  There is normal cardiac function.  There is no evidence of septation defect, right or left ventricular outflow obstruction or significant valvular regurgitation.  The fetal heart rate was within normal limits without ectopy or arrhythmia seen.  The spectral Doppler pattern across all valves, venous structures, and arterial structures was within normal limits.  There is no pericardial effusion.  Please see full report for details.    In summary, " Mayra Coronado is a 24 y.o. year-old  woman, currently 18w2d weeks gestation, who had a normal fetal echocardiogram at today's visit.  Therefore, we did not make any changes to her current delivery plan.  We did not prescribe any medications.  We did not recommend intervention.  She does not necessarily need to follow up with pediatric cardiology after the baby is born unless the pediatric team has any concerns or worries.     According to the latest guidelines for fetal echocardiogram from the American Society of Echocardiogram, a family history of CHD in a 2nd degree relative is no longer an indication for fetal echo.  For future pregnancies, with a somewhat vague history of a murmur in the FOB's sister, fetal echocardiogram would only be warranted for concerns on detailed anatomy scanning.    LOC: 0  Normal fetal echocardiogram within the limitations of ultrasound. No changes were made to current delivery plan. Triage code 0:  Delivery per OB at patient's preferred hospital.  Standard  care per  team.  Cardiology consult not necessary, unless there are clinical concerns.       Scribe’s statement: I, Janna Arzola CNP, am scribing for, and in the presence of, Dr. Sam.    Thank you for allowing me to participate in Mayra's care.  If you have any further questions, please do not hesitate to contact me.     Rinku Sam M.D.  [facility], Director  Ambulatory Pediatric Cardiology   Division of Pediatric Cardiology  [facility]  [facility]   of Pediatrics, [facility] [employer]  [facility] -   [address] [place] [address]  [place] [id number]  Office:  [phone]  Fax:       [phone]  e-mail:  [email or web address]    I spent greater than 45 minutes in performance of this consultation, of which greater than 50% was  related to coordination of care or counseling.

## 2025-04-03 ENCOUNTER — ROUTINE PRENATAL (OUTPATIENT)
Dept: OBSTETRICS AND GYNECOLOGY | Facility: CLINIC | Age: 25
End: 2025-04-03
Payer: MEDICAID

## 2025-04-03 VITALS
DIASTOLIC BLOOD PRESSURE: 67 MMHG | HEART RATE: 92 BPM | WEIGHT: 176.6 LBS | HEIGHT: 64 IN | BODY MASS INDEX: 30.15 KG/M2 | SYSTOLIC BLOOD PRESSURE: 100 MMHG

## 2025-04-03 DIAGNOSIS — Z3A.21 21 WEEKS GESTATION OF PREGNANCY (HHS-HCC): ICD-10-CM

## 2025-04-03 DIAGNOSIS — Z34.00 SUPERVISION OF NORMAL FIRST PREGNANCY, ANTEPARTUM (HHS-HCC): Primary | ICD-10-CM

## 2025-04-03 LAB
POC APPEARANCE, URINE: CLEAR
POC BILIRUBIN, URINE: NEGATIVE
POC BLOOD, URINE: NEGATIVE
POC COLOR, URINE: YELLOW
POC GLUCOSE, URINE: NEGATIVE MG/DL
POC KETONES, URINE: NEGATIVE MG/DL
POC LEUKOCYTES, URINE: ABNORMAL
POC NITRITE,URINE: NEGATIVE
POC PH, URINE: 7 PH
POC PROTEIN, URINE: NEGATIVE MG/DL
POC SPECIFIC GRAVITY, URINE: 1.02
POC UROBILINOGEN, URINE: 0.2 EU/DL

## 2025-04-03 PROCEDURE — 81003 URINALYSIS AUTO W/O SCOPE: CPT | Performed by: OBSTETRICS & GYNECOLOGY

## 2025-04-03 PROCEDURE — 99213 OFFICE O/P EST LOW 20 MIN: CPT | Mod: TH | Performed by: OBSTETRICS & GYNECOLOGY

## 2025-04-03 PROCEDURE — 99213 OFFICE O/P EST LOW 20 MIN: CPT | Performed by: OBSTETRICS & GYNECOLOGY

## 2025-04-03 ASSESSMENT — ENCOUNTER SYMPTOMS
LOSS OF SENSATION IN FEET: 0
OCCASIONAL FEELINGS OF UNSTEADINESS: 0
DEPRESSION: 0

## 2025-04-03 ASSESSMENT — LIFESTYLE VARIABLES
AUDIT-C TOTAL SCORE: 0
SKIP TO QUESTIONS 9-10: 1
HOW MANY STANDARD DRINKS CONTAINING ALCOHOL DO YOU HAVE ON A TYPICAL DAY: PATIENT DOES NOT DRINK
HOW OFTEN DO YOU HAVE SIX OR MORE DRINKS ON ONE OCCASION: NEVER
HOW OFTEN DO YOU HAVE A DRINK CONTAINING ALCOHOL: NEVER

## 2025-04-03 ASSESSMENT — PAIN SCALES - GENERAL: PAINLEVEL_OUTOF10: 0 - NO PAIN

## 2025-04-03 ASSESSMENT — PAIN - FUNCTIONAL ASSESSMENT: PAIN_FUNCTIONAL_ASSESSMENT: 0-10

## 2025-04-03 NOTE — PROGRESS NOTES
"Subjective   Patient ID [id number]   Mayra Coronado is a 25 y.o.  at 21w2d with a working estimated date of delivery of 2025, by Last Menstrual Period who presents for a routine prenatal visit. She denies vaginal bleeding, leakage of fluid, decreased fetal movements, or contractions.    Her pregnancy is complicated by:   FOB sister w/\"hole in heart\", fetal echo at 22wks wnl   Initial UDS +THC, pt already stopped, repeat at 36wks    Objective   Physical Exam  Weight: 80.1 kg (176 lb 9.6 oz)  Pregravid BMI: 27.11  BP: 100/67  Fetal Heart Rate: 140 Fundal Height (cm): 21 cm             Prenatal Labs  Urine dip:  Results for orders placed or performed in visit on 25   POCT UA Automated manually resulted    Collection Time: 25  2:35 PM   Result Value Ref Range    POC Color, Urine Yellow Straw, Yellow, Light-Yellow    POC Appearance, Urine Clear Clear    POC Glucose, Urine NEGATIVE NEGATIVE mg/dl    POC Bilirubin, Urine NEGATIVE NEGATIVE    POC Ketones, Urine NEGATIVE NEGATIVE mg/dl    POC Specific Gravity, Urine 1.020 1.005 - 1.035    POC Blood, Urine NEGATIVE NEGATIVE    POC PH, Urine 7.5 No Reference Range Established PH    POC Protein, Urine NEGATIVE NEGATIVE mg/dl    POC Urobilinogen, Urine 1.0 0.2, 1.0 EU/DL    Poc Nitrite, Urine NEGATIVE NEGATIVE    POC Leukocytes, Urine TRACE (A) NEGATIVE     Imaging pend    Assessment/Plan   Problem List Items Addressed This Visit    None  Visit Diagnoses         Codes    Supervision of normal first pregnancy, antepartum ([facility])    -  Primary Z34.00    Relevant Orders    POCT UA Automated manually resulted    Glucose, 1 Hour Screen, Pregnancy    CBC Anemia Panel With Reflex, Pregnancy    21 weeks gestation of pregnancy ([facility])     Z3A.21            Continue prenatal vitamin.  Labs reviewed.  Movement counts   labor precautions  Rhogam if indicated  GTT at nv.  Follow up in 2 weeks for a routine prenatal visit.  "

## 2025-04-09 ENCOUNTER — HOSPITAL ENCOUNTER (OUTPATIENT)
Dept: RADIOLOGY | Facility: CLINIC | Age: 25
Discharge: HOME | End: 2025-04-09
Payer: MEDICAID

## 2025-04-09 DIAGNOSIS — Z03.73 ENCOUNTER FOR SUSPECTED FETAL ANOMALY RULED OUT: ICD-10-CM

## 2025-04-09 DIAGNOSIS — Z34.90 PRENATAL CARE, ANTEPARTUM: ICD-10-CM

## 2025-04-09 DIAGNOSIS — O99.210 OBESITY AFFECTING PREGNANCY (HHS-HCC): ICD-10-CM

## 2025-04-09 PROCEDURE — 76811 OB US DETAILED SNGL FETUS: CPT

## 2025-04-09 PROCEDURE — 76811 OB US DETAILED SNGL FETUS: CPT | Performed by: OBSTETRICS & GYNECOLOGY

## 2025-05-03 DIAGNOSIS — Z34.00 SUPERVISION OF NORMAL FIRST PREGNANCY, ANTEPARTUM (HHS-HCC): ICD-10-CM

## 2025-05-07 ENCOUNTER — ROUTINE PRENATAL (OUTPATIENT)
Dept: OBSTETRICS AND GYNECOLOGY | Facility: CLINIC | Age: 25
End: 2025-05-07
Payer: MEDICAID

## 2025-05-07 VITALS
DIASTOLIC BLOOD PRESSURE: 72 MMHG | SYSTOLIC BLOOD PRESSURE: 116 MMHG | BODY MASS INDEX: 31.99 KG/M2 | WEIGHT: 187.4 LBS | HEIGHT: 64 IN

## 2025-05-07 DIAGNOSIS — Z34.00 SUPERVISION OF NORMAL FIRST PREGNANCY, ANTEPARTUM (HHS-HCC): Primary | ICD-10-CM

## 2025-05-07 DIAGNOSIS — Z3A.26 26 WEEKS GESTATION OF PREGNANCY (HHS-HCC): ICD-10-CM

## 2025-05-07 LAB
POC APPEARANCE, URINE: CLEAR
POC BILIRUBIN, URINE: NEGATIVE
POC BLOOD, URINE: NEGATIVE
POC COLOR, URINE: YELLOW
POC GLUCOSE, URINE: NEGATIVE MG/DL
POC KETONES, URINE: NEGATIVE MG/DL
POC LEUKOCYTES, URINE: ABNORMAL
POC NITRITE,URINE: NEGATIVE
POC PH, URINE: 6 PH
POC PROTEIN, URINE: ABNORMAL MG/DL
POC SPECIFIC GRAVITY, URINE: >=1.03
POC UROBILINOGEN, URINE: 0.2 EU/DL

## 2025-05-07 PROCEDURE — 85027 COMPLETE CBC AUTOMATED: CPT

## 2025-05-07 PROCEDURE — 99213 OFFICE O/P EST LOW 20 MIN: CPT | Mod: TH | Performed by: OBSTETRICS & GYNECOLOGY

## 2025-05-07 PROCEDURE — 81003 URINALYSIS AUTO W/O SCOPE: CPT | Mod: QW | Performed by: OBSTETRICS & GYNECOLOGY

## 2025-05-07 PROCEDURE — 99213 OFFICE O/P EST LOW 20 MIN: CPT | Performed by: OBSTETRICS & GYNECOLOGY

## 2025-05-07 ASSESSMENT — PAIN - FUNCTIONAL ASSESSMENT: PAIN_FUNCTIONAL_ASSESSMENT: 0-10

## 2025-05-07 ASSESSMENT — LIFESTYLE VARIABLES
SKIP TO QUESTIONS 9-10: 1
HOW OFTEN DO YOU HAVE SIX OR MORE DRINKS ON ONE OCCASION: NEVER
AUDIT-C TOTAL SCORE: 0
HOW OFTEN DO YOU HAVE A DRINK CONTAINING ALCOHOL: NEVER
HOW MANY STANDARD DRINKS CONTAINING ALCOHOL DO YOU HAVE ON A TYPICAL DAY: PATIENT DOES NOT DRINK

## 2025-05-07 ASSESSMENT — ENCOUNTER SYMPTOMS
DEPRESSION: 0
OCCASIONAL FEELINGS OF UNSTEADINESS: 0
LOSS OF SENSATION IN FEET: 0

## 2025-05-07 ASSESSMENT — PAIN SCALES - GENERAL: PAINLEVEL_OUTOF10: 0 - NO PAIN

## 2025-05-07 NOTE — PROGRESS NOTES
"Subjective   Patient ID [id number]   Mayra Coronado is a 25 y.o.  at 26w1d with a working estimated date of delivery of 2025, by Last Menstrual Period who presents for a routine prenatal visit.     Her pregnancy is complicated by:   FOB sister w/\"hole in heart\", fetal echo at 22wks wnl   Initial UDS +THC, pt already stopped, repeat at 36wks  Needs grwoth scan 34wks    Objective   Physical Exam  Weight: 85 kg (187 lb 6.4 oz)  Pregravid BMI: 27.11  BP: 116/72  Fetal Heart Rate: 135 Fundal Height (cm): 26 cm             Prenatal Labs  Urine dip:  Results for orders placed or performed in visit on 25   POCT UA Automated manually resulted    Collection Time: 25 11:17 AM   Result Value Ref Range    POC Color, Urine Yellow Straw, Yellow, Light-Yellow    POC Appearance, Urine Clear Clear    POC Glucose, Urine NEGATIVE NEGATIVE mg/dl    POC Bilirubin, Urine NEGATIVE NEGATIVE    POC Ketones, Urine NEGATIVE NEGATIVE mg/dl    POC Specific Gravity, Urine >=1.030 1.005 - 1.035    POC Blood, Urine NEGATIVE NEGATIVE    POC PH, Urine 6.0 No Reference Range Established PH    POC Protein, Urine TRACE (A) NEGATIVE mg/dl    POC Urobilinogen, Urine 0.2 0.2, 1.0 EU/DL    Poc Nitrite, Urine NEGATIVE NEGATIVE    POC Leukocytes, Urine SMALL (1+) (A) NEGATIVE       Assessment/Plan   Problem List Items Addressed This Visit    None  Visit Diagnoses         Codes      Supervision of normal first pregnancy, antepartum ([facility])    -  Primary Z34.00    Relevant Orders    POCT UA Automated manually resulted (Completed)      26 weeks gestation of pregnancy ([facility])     Z3A.26        Tdap at nv  Continue prenatal vitamin.  Labs reviewed.  Schedule anatomy ultrasound.  Follow up in 4 weeks for a routine prenatal visit.  "

## 2025-05-08 ENCOUNTER — APPOINTMENT (OUTPATIENT)
Dept: LAB | Facility: HOSPITAL | Age: 25
End: 2025-05-08
Payer: MEDICAID

## 2025-05-08 LAB
ERYTHROCYTE [DISTWIDTH] IN BLOOD BY AUTOMATED COUNT: 14.5 % (ref 11.5–14.5)
GLUCOSE 1H P 50 G GLC PO SERPL-MCNC: 124 MG/DL
HCT VFR BLD AUTO: 40.9 % (ref 36–46)
HGB BLD-MCNC: 13 G/DL (ref 12–16)
MCH RBC QN AUTO: 31.1 PG (ref 26–34)
MCHC RBC AUTO-ENTMCNC: 31.8 G/DL (ref 32–36)
MCV RBC AUTO: 98 FL (ref 80–100)
NRBC BLD-RTO: 0 /100 WBCS (ref 0–0)
PLATELET # BLD AUTO: 288 X10*3/UL (ref 150–450)
RBC # BLD AUTO: 4.18 X10*6/UL (ref 4–5.2)
REFLEX ADDED, ANEMIA PANEL: NORMAL
WBC # BLD AUTO: 11.6 X10*3/UL (ref 4.4–11.3)

## 2025-05-21 ENCOUNTER — ROUTINE PRENATAL (OUTPATIENT)
Dept: OBSTETRICS AND GYNECOLOGY | Facility: CLINIC | Age: 25
End: 2025-05-21
Payer: MEDICAID

## 2025-05-21 VITALS
WEIGHT: 189.6 LBS | HEIGHT: 64 IN | DIASTOLIC BLOOD PRESSURE: 77 MMHG | BODY MASS INDEX: 32.37 KG/M2 | SYSTOLIC BLOOD PRESSURE: 119 MMHG

## 2025-05-21 DIAGNOSIS — Z23 NEED FOR TDAP VACCINATION: ICD-10-CM

## 2025-05-21 DIAGNOSIS — Z3A.28 28 WEEKS GESTATION OF PREGNANCY (HHS-HCC): ICD-10-CM

## 2025-05-21 DIAGNOSIS — Z34.00 SUPERVISION OF NORMAL FIRST PREGNANCY, ANTEPARTUM (HHS-HCC): Primary | ICD-10-CM

## 2025-05-21 LAB
POC APPEARANCE, URINE: CLEAR
POC BILIRUBIN, URINE: NEGATIVE
POC BLOOD, URINE: NEGATIVE
POC COLOR, URINE: YELLOW
POC GLUCOSE, URINE: NEGATIVE MG/DL
POC KETONES, URINE: NEGATIVE MG/DL
POC LEUKOCYTES, URINE: ABNORMAL
POC NITRITE,URINE: NEGATIVE
POC PH, URINE: 6.5 PH
POC PROTEIN, URINE: ABNORMAL MG/DL
POC SPECIFIC GRAVITY, URINE: >=1.03
POC UROBILINOGEN, URINE: 0.2 EU/DL

## 2025-05-21 PROCEDURE — 90715 TDAP VACCINE 7 YRS/> IM: CPT | Performed by: OBSTETRICS & GYNECOLOGY

## 2025-05-21 PROCEDURE — 99213 OFFICE O/P EST LOW 20 MIN: CPT | Performed by: OBSTETRICS & GYNECOLOGY

## 2025-05-21 PROCEDURE — 81003 URINALYSIS AUTO W/O SCOPE: CPT | Mod: QW | Performed by: OBSTETRICS & GYNECOLOGY

## 2025-05-21 PROCEDURE — 99213 OFFICE O/P EST LOW 20 MIN: CPT | Mod: 25,TH | Performed by: OBSTETRICS & GYNECOLOGY

## 2025-05-21 ASSESSMENT — LIFESTYLE VARIABLES
AUDIT-C TOTAL SCORE: 0
HOW OFTEN DO YOU HAVE A DRINK CONTAINING ALCOHOL: NEVER
HOW OFTEN DO YOU HAVE SIX OR MORE DRINKS ON ONE OCCASION: NEVER
SKIP TO QUESTIONS 9-10: 1
HOW MANY STANDARD DRINKS CONTAINING ALCOHOL DO YOU HAVE ON A TYPICAL DAY: PATIENT DOES NOT DRINK

## 2025-05-21 ASSESSMENT — ENCOUNTER SYMPTOMS
OCCASIONAL FEELINGS OF UNSTEADINESS: 0
LOSS OF SENSATION IN FEET: 0
DEPRESSION: 0

## 2025-05-21 ASSESSMENT — PAIN - FUNCTIONAL ASSESSMENT: PAIN_FUNCTIONAL_ASSESSMENT: 0-10

## 2025-05-21 ASSESSMENT — PAIN SCALES - GENERAL: PAINLEVEL_OUTOF10: 0 - NO PAIN

## 2025-05-21 NOTE — PROGRESS NOTES
"Subjective   Patient ID [id number]   Mayra Coronado is a 25 y.o.  at 28w1d with a working estimated date of delivery of 2025, by Last Menstrual Period who presents for a routine prenatal visit. She denies vaginal bleeding, leakage of fluid, decreased fetal movements, or contractions.    Her pregnancy is complicated by:  FOB sister w/\"hole in heart\", fetal echo at 22wks wnl   Initial UDS +THC, pt already stopped, repeat at 36wks  Needs grwoth scan 34wks    Objective   Physical Exam  Weight: 86 kg (189 lb 9.6 oz)  Pregravid BMI: 27.11  BP: 119/77  Fetal Heart Rate: 130 Fundal Height (cm): 29 cm             Prenatal Labs  Urine dip:  Results for orders placed or performed in visit on 25   POCT UA Automated manually resulted    Collection Time: 25 11:21 AM   Result Value Ref Range    POC Color, Urine Yellow Straw, Yellow, Light-Yellow    POC Appearance, Urine Clear Clear    POC Glucose, Urine NEGATIVE NEGATIVE mg/dl    POC Bilirubin, Urine NEGATIVE NEGATIVE    POC Ketones, Urine NEGATIVE NEGATIVE mg/dl    POC Specific Gravity, Urine >=1.030 1.005 - 1.035    POC Blood, Urine NEGATIVE NEGATIVE    POC PH, Urine 6.5 No Reference Range Established PH    POC Protein, Urine 30 (1+) (A) NEGATIVE mg/dl    POC Urobilinogen, Urine 0.2 0.2, 1.0 EU/DL    Poc Nitrite, Urine NEGATIVE NEGATIVE    POC Leukocytes, Urine TRACE (A) NEGATIVE     Imaging rev'd    Assessment/Plan   Problem List Items Addressed This Visit    None  Visit Diagnoses         Codes      Supervision of normal first pregnancy, antepartum ([facility])    -  Primary Z34.00    Relevant Orders    POCT UA Automated manually resulted (Completed)      28 weeks gestation of pregnancy ([facility])     Z3A.28      Need for Tdap vaccination     Z23    Relevant Orders    Tdap vaccine, age 7 years and older (Completed)          Continue prenatal vitamin.  Labs reviewed.  Movement counts   labor precautions      Follow up in 2 weeks for a routine prenatal " visit.

## 2025-05-28 ENCOUNTER — APPOINTMENT (OUTPATIENT)
Dept: OBSTETRICS AND GYNECOLOGY | Facility: CLINIC | Age: 25
End: 2025-05-28
Payer: MEDICAID

## 2025-06-04 ENCOUNTER — ROUTINE PRENATAL (OUTPATIENT)
Dept: OBSTETRICS AND GYNECOLOGY | Facility: CLINIC | Age: 25
End: 2025-06-04
Payer: MEDICAID

## 2025-06-04 VITALS — WEIGHT: 194 LBS | BODY MASS INDEX: 33.28 KG/M2 | SYSTOLIC BLOOD PRESSURE: 113 MMHG | DIASTOLIC BLOOD PRESSURE: 78 MMHG

## 2025-06-04 DIAGNOSIS — Z34.00 SUPERVISION OF NORMAL FIRST PREGNANCY, ANTEPARTUM (HHS-HCC): Primary | ICD-10-CM

## 2025-06-04 DIAGNOSIS — Z3A.30 30 WEEKS GESTATION OF PREGNANCY (HHS-HCC): ICD-10-CM

## 2025-06-04 LAB
POC APPEARANCE, URINE: CLEAR
POC BILIRUBIN, URINE: NEGATIVE
POC BLOOD, URINE: NEGATIVE
POC COLOR, URINE: YELLOW
POC GLUCOSE, URINE: NEGATIVE MG/DL
POC KETONES, URINE: NEGATIVE MG/DL
POC LEUKOCYTES, URINE: ABNORMAL
POC NITRITE,URINE: NEGATIVE
POC PH, URINE: 6.5 PH
POC PROTEIN, URINE: ABNORMAL MG/DL
POC SPECIFIC GRAVITY, URINE: 1.02
POC UROBILINOGEN, URINE: 0.2 EU/DL

## 2025-06-04 PROCEDURE — 99213 OFFICE O/P EST LOW 20 MIN: CPT | Performed by: OBSTETRICS & GYNECOLOGY

## 2025-06-04 PROCEDURE — 81003 URINALYSIS AUTO W/O SCOPE: CPT | Performed by: OBSTETRICS & GYNECOLOGY

## 2025-06-04 PROCEDURE — 99213 OFFICE O/P EST LOW 20 MIN: CPT | Mod: TH | Performed by: OBSTETRICS & GYNECOLOGY

## 2025-06-04 ASSESSMENT — LIFESTYLE VARIABLES
AUDIT-C TOTAL SCORE: 0
HOW OFTEN DO YOU HAVE A DRINK CONTAINING ALCOHOL: NEVER
HOW MANY STANDARD DRINKS CONTAINING ALCOHOL DO YOU HAVE ON A TYPICAL DAY: PATIENT DOES NOT DRINK
HOW OFTEN DO YOU HAVE SIX OR MORE DRINKS ON ONE OCCASION: NEVER
SKIP TO QUESTIONS 9-10: 1

## 2025-06-04 ASSESSMENT — ENCOUNTER SYMPTOMS
DEPRESSION: 0
OCCASIONAL FEELINGS OF UNSTEADINESS: 0
LOSS OF SENSATION IN FEET: 0

## 2025-06-04 NOTE — PROGRESS NOTES
"Subjective   Patient ID [id number]   Mayra Coronado is a 25 y.o.  at 30w1d with a working estimated date of delivery of 2025, by Last Menstrual Period who presents for a routine prenatal visit. She denies vaginal bleeding, leakage of fluid, decreased fetal movements, or contractions.    Her pregnancy is complicated by:  FOB sister w/\"hole in heart\", fetal echo at 22wks wnl   Initial UDS +THC, pt already stopped, repeat at 36wks    Objective   Physical Exam  Weight: 88 kg (194 lb)  Pregravid BMI: 27.11  BP: 113/78  Fetal Heart Rate: 150 Fundal Height (cm): 31 cm             Prenatal Labs  Urine dip:  Results for orders placed or performed in visit on 25   POCT UA Automated manually resulted    Collection Time: 25 11:20 AM   Result Value Ref Range    POC Color, Urine Yellow Straw, Yellow, Light-Yellow    POC Appearance, Urine Clear Clear    POC Glucose, Urine NEGATIVE NEGATIVE mg/dl    POC Bilirubin, Urine NEGATIVE NEGATIVE    POC Ketones, Urine NEGATIVE NEGATIVE mg/dl    POC Specific Gravity, Urine 1.025 1.005 - 1.035    POC Blood, Urine NEGATIVE NEGATIVE    POC PH, Urine 6.5 No Reference Range Established PH    POC Protein, Urine TRACE (A) NEGATIVE mg/dl    POC Urobilinogen, Urine 0.2 0.2, 1.0 EU/DL    Poc Nitrite, Urine NEGATIVE NEGATIVE    POC Leukocytes, Urine SMALL (1+) (A) NEGATIVE     Imaging rev'd    Assessment/Plan   Problem List Items Addressed This Visit    None  Visit Diagnoses         Codes      Supervision of normal first pregnancy, antepartum ([facility])    -  Primary Z34.00    Relevant Orders    POCT UA Automated manually resulted (Completed)      30 weeks gestation of pregnancy ([facility])     Z3A.30          Continue prenatal vitamin.  Labs reviewed.  Movement counts   labor precautions    Follow up in 2 weeks for a routine prenatal visit.  "

## 2025-06-18 ENCOUNTER — ROUTINE PRENATAL (OUTPATIENT)
Dept: OBSTETRICS AND GYNECOLOGY | Facility: CLINIC | Age: 25
End: 2025-06-18
Payer: MEDICAID

## 2025-06-18 VITALS — BODY MASS INDEX: 33.76 KG/M2 | SYSTOLIC BLOOD PRESSURE: 116 MMHG | WEIGHT: 196.8 LBS | DIASTOLIC BLOOD PRESSURE: 77 MMHG

## 2025-06-18 DIAGNOSIS — Z34.00 SUPERVISION OF NORMAL FIRST PREGNANCY, ANTEPARTUM (HHS-HCC): Primary | ICD-10-CM

## 2025-06-18 DIAGNOSIS — Z3A.32 32 WEEKS GESTATION OF PREGNANCY (HHS-HCC): ICD-10-CM

## 2025-06-18 LAB
POC APPEARANCE, URINE: CLEAR
POC BILIRUBIN, URINE: NEGATIVE
POC BLOOD, URINE: NEGATIVE
POC COLOR, URINE: YELLOW
POC GLUCOSE, URINE: ABNORMAL MG/DL
POC KETONES, URINE: NEGATIVE MG/DL
POC LEUKOCYTES, URINE: ABNORMAL
POC NITRITE,URINE: NEGATIVE
POC PH, URINE: 6.5 PH
POC PROTEIN, URINE: NEGATIVE MG/DL
POC SPECIFIC GRAVITY, URINE: 1.02
POC UROBILINOGEN, URINE: 0.2 EU/DL

## 2025-06-18 PROCEDURE — 81003 URINALYSIS AUTO W/O SCOPE: CPT | Mod: QW | Performed by: OBSTETRICS & GYNECOLOGY

## 2025-06-18 PROCEDURE — 99213 OFFICE O/P EST LOW 20 MIN: CPT | Mod: TH | Performed by: OBSTETRICS & GYNECOLOGY

## 2025-06-18 PROCEDURE — 99213 OFFICE O/P EST LOW 20 MIN: CPT | Performed by: OBSTETRICS & GYNECOLOGY

## 2025-06-18 RX ORDER — DIPHENHYDRAMINE HCL 25 MG
25 CAPSULE ORAL NIGHTLY PRN
COMMUNITY
End: 2025-06-18 | Stop reason: WASHOUT

## 2025-06-18 ASSESSMENT — PAIN SCALES - GENERAL: PAINLEVEL_OUTOF10: 0 - NO PAIN

## 2025-06-18 ASSESSMENT — ENCOUNTER SYMPTOMS
DEPRESSION: 0
OCCASIONAL FEELINGS OF UNSTEADINESS: 0
LOSS OF SENSATION IN FEET: 0

## 2025-06-18 ASSESSMENT — PAIN - FUNCTIONAL ASSESSMENT: PAIN_FUNCTIONAL_ASSESSMENT: 0-10

## 2025-06-18 ASSESSMENT — LIFESTYLE VARIABLES
AUDIT-C TOTAL SCORE: 0
HOW OFTEN DO YOU HAVE SIX OR MORE DRINKS ON ONE OCCASION: NEVER
SKIP TO QUESTIONS 9-10: 1
HOW MANY STANDARD DRINKS CONTAINING ALCOHOL DO YOU HAVE ON A TYPICAL DAY: PATIENT DOES NOT DRINK
HOW OFTEN DO YOU HAVE A DRINK CONTAINING ALCOHOL: NEVER

## 2025-06-18 NOTE — PROGRESS NOTES
"Subjective   Patient ID [id number]   Mayra Coronado is a 25 y.o.  at 32w1d with a working estimated date of delivery of 2025, by Last Menstrual Period who presents for a routine prenatal visit. She denies vaginal bleeding, leakage of fluid, decreased fetal movements, or contractions.    Her pregnancy is complicated by:  FOB sister w/\"hole in heart\", fetal echo at 22wks wnl   Initial UDS +THC, pt already stopped, repeat at 36wks    Objective   Physical Exam  Weight: 89.3 kg (196 lb 12.8 oz)  Pregravid BMI: 27.11  BP: 116/77  Fetal Heart Rate: 155 Fundal Height (cm): 34 cm             Prenatal Labs  Urine dip:  Results for orders placed or performed in visit on 25   POCT UA Automated manually resulted    Collection Time: 25 11:18 AM   Result Value Ref Range    POC Color, Urine Yellow Straw, Yellow, Light-Yellow    POC Appearance, Urine Clear Clear    POC Glucose, Urine 100 (1+) (A) NEGATIVE mg/dl    POC Bilirubin, Urine NEGATIVE NEGATIVE    POC Ketones, Urine NEGATIVE NEGATIVE mg/dl    POC Specific Gravity, Urine 1.025 1.005 - 1.035    POC Blood, Urine NEGATIVE NEGATIVE    POC PH, Urine 6.5 No Reference Range Established PH    POC Protein, Urine NEGATIVE NEGATIVE mg/dl    POC Urobilinogen, Urine 0.2 0.2, 1.0 EU/DL    Poc Nitrite, Urine NEGATIVE NEGATIVE    POC Leukocytes, Urine SMALL (1+) (A) NEGATIVE     Imaging rev'd    Assessment/Plan   Problem List Items Addressed This Visit    None  Visit Diagnoses         Codes      Supervision of normal first pregnancy, antepartum ([facility])    -  Primary Z34.00    Relevant Orders    POCT UA Automated manually resulted (Completed)      32 weeks gestation of pregnancy ([facility])     Z3A.32          Continue prenatal vitamin.  Labs reviewed.  Movement counts   labor precautions  Did eat sweet cereal this am  Follow up in 2 weeks for a routine prenatal visit.  "

## 2025-07-02 ENCOUNTER — ROUTINE PRENATAL (OUTPATIENT)
Dept: OBSTETRICS AND GYNECOLOGY | Facility: CLINIC | Age: 25
End: 2025-07-02
Payer: MEDICAID

## 2025-07-02 VITALS — SYSTOLIC BLOOD PRESSURE: 119 MMHG | DIASTOLIC BLOOD PRESSURE: 80 MMHG | BODY MASS INDEX: 34.14 KG/M2 | WEIGHT: 199 LBS

## 2025-07-02 DIAGNOSIS — F12.11 TETRAHYDROCANNABINOL (THC) USE DISORDER, MILD, IN EARLY REMISSION, ABUSE: ICD-10-CM

## 2025-07-02 DIAGNOSIS — Z3A.34 34 WEEKS GESTATION OF PREGNANCY (HHS-HCC): ICD-10-CM

## 2025-07-02 DIAGNOSIS — Z34.00 SUPERVISION OF NORMAL FIRST PREGNANCY, ANTEPARTUM (HHS-HCC): Primary | ICD-10-CM

## 2025-07-02 LAB
POC APPEARANCE, URINE: ABNORMAL
POC BILIRUBIN, URINE: NEGATIVE
POC BLOOD, URINE: NEGATIVE
POC COLOR, URINE: YELLOW
POC GLUCOSE, URINE: NEGATIVE MG/DL
POC KETONES, URINE: NEGATIVE MG/DL
POC LEUKOCYTES, URINE: ABNORMAL
POC NITRITE,URINE: NEGATIVE
POC PH, URINE: 7 PH
POC PROTEIN, URINE: ABNORMAL MG/DL
POC SPECIFIC GRAVITY, URINE: 1.02
POC UROBILINOGEN, URINE: 0.2 EU/DL

## 2025-07-02 PROCEDURE — 99213 OFFICE O/P EST LOW 20 MIN: CPT | Performed by: OBSTETRICS & GYNECOLOGY

## 2025-07-02 PROCEDURE — 99213 OFFICE O/P EST LOW 20 MIN: CPT | Mod: TH | Performed by: OBSTETRICS & GYNECOLOGY

## 2025-07-02 PROCEDURE — 81003 URINALYSIS AUTO W/O SCOPE: CPT | Performed by: OBSTETRICS & GYNECOLOGY

## 2025-07-02 RX ORDER — DIPHENHYDRAMINE HCL 25 MG
CAPSULE ORAL
COMMUNITY

## 2025-07-02 RX ORDER — FAMOTIDINE 20 MG/1
TABLET, FILM COATED ORAL
COMMUNITY

## 2025-07-02 ASSESSMENT — ANXIETY QUESTIONNAIRES
1. FEELING NERVOUS, ANXIOUS, OR ON EDGE: NOT AT ALL
5. BEING SO RESTLESS THAT IT IS HARD TO SIT STILL: NOT AT ALL
6. BECOMING EASILY ANNOYED OR IRRITABLE: NOT AT ALL
3. WORRYING TOO MUCH ABOUT DIFFERENT THINGS: NOT AT ALL
2. NOT BEING ABLE TO STOP OR CONTROL WORRYING: NOT AT ALL
7. FEELING AFRAID AS IF SOMETHING AWFUL MIGHT HAPPEN: NOT AT ALL
4. TROUBLE RELAXING: NOT AT ALL
GAD7 TOTAL SCORE: 0

## 2025-07-02 ASSESSMENT — ENCOUNTER SYMPTOMS
DEPRESSION: 0
OCCASIONAL FEELINGS OF UNSTEADINESS: 0
LOSS OF SENSATION IN FEET: 0

## 2025-07-02 ASSESSMENT — PAIN SCALES - GENERAL: PAINLEVEL_OUTOF10: 0 - NO PAIN

## 2025-07-02 ASSESSMENT — PAIN - FUNCTIONAL ASSESSMENT: PAIN_FUNCTIONAL_ASSESSMENT: 0-10

## 2025-07-02 NOTE — PROGRESS NOTES
"Subjective   Patient ID [id number]   Mayra Coronado is a 25 y.o.  at 34w1d with a working estimated date of delivery of 2025, by Last Menstrual Period who presents for a routine prenatal visit. She denies vaginal bleeding, leakage of fluid, decreased fetal movements, or contractions.    Her pregnancy is complicated by:  FOB sister w/\"hole in heart\", fetal echo at 22wks wnl   Initial UDS +THC, pt already stopped, repeat at 36wks, ordered   tdap given at 28wks    Objective   Physical Exam  Weight: 90.3 kg (199 lb)  Pregravid BMI: 27.11  BP: 119/80  Fetal Heart Rate: 140 Fundal Height (cm): 35 cm             Prenatal Labs  Urine dip:  Results for orders placed or performed in visit on 25   POCT UA Automated manually resulted    Collection Time: 25 11:08 AM   Result Value Ref Range    POC Color, Urine Yellow Straw, Yellow, Light-Yellow    POC Appearance, Urine Cloudy (A) Clear    POC Glucose, Urine NEGATIVE NEGATIVE mg/dl    POC Bilirubin, Urine NEGATIVE NEGATIVE    POC Ketones, Urine NEGATIVE NEGATIVE mg/dl    POC Specific Gravity, Urine 1.020 1.005 - 1.035    POC Blood, Urine NEGATIVE NEGATIVE    POC PH, Urine 7.0 No Reference Range Established PH    POC Protein, Urine TRACE (A) NEGATIVE mg/dl    POC Urobilinogen, Urine 0.2 0.2, 1.0 EU/DL    Poc Nitrite, Urine NEGATIVE NEGATIVE    POC Leukocytes, Urine SMALL (1+) (A) NEGATIVE     Imaging rev;d    Assessment/Plan   Problem List Items Addressed This Visit    None  Visit Diagnoses         Codes      Supervision of normal first pregnancy, antepartum ([facility])    -  Primary Z34.00    Relevant Orders    POCT UA Automated manually resulted (Completed)      34 weeks gestation of pregnancy ([facility])     Z3A.34          Continue prenatal vitamin.  Labs reviewed.  Movement counts   labor precautions    Follow up in 2 weeks for a routine prenatal visit.  "

## 2025-07-07 ENCOUNTER — OFFICE VISIT (OUTPATIENT)
Dept: URGENT CARE | Age: 25
End: 2025-07-07
Payer: MEDICAID

## 2025-07-07 ENCOUNTER — PATIENT MESSAGE (OUTPATIENT)
Dept: OBSTETRICS AND GYNECOLOGY | Facility: CLINIC | Age: 25
End: 2025-07-07
Payer: MEDICAID

## 2025-07-07 VITALS
SYSTOLIC BLOOD PRESSURE: 129 MMHG | WEIGHT: 198 LBS | RESPIRATION RATE: 18 BRPM | TEMPERATURE: 98.2 F | DIASTOLIC BLOOD PRESSURE: 86 MMHG | HEIGHT: 64 IN | BODY MASS INDEX: 33.8 KG/M2 | OXYGEN SATURATION: 100 % | HEART RATE: 108 BPM

## 2025-07-07 DIAGNOSIS — O26.86: Primary | ICD-10-CM

## 2025-07-07 DIAGNOSIS — Z34.90 PREGNANCY, UNSPECIFIED GESTATIONAL AGE (HHS-HCC): ICD-10-CM

## 2025-07-07 RX ORDER — DIPHENHYDRAMINE HCL 25 MG
CAPSULE ORAL
COMMUNITY

## 2025-07-07 RX ORDER — TRIAMCINOLONE ACETONIDE 1 MG/G
CREAM TOPICAL 2 TIMES DAILY
Qty: 30 G | Refills: 0 | Status: SHIPPED | OUTPATIENT
Start: 2025-07-07

## 2025-07-07 NOTE — PATIENT COMMUNICATION
Attempted to contact pt/left detailed voice message regarding Dr. Kebede's recommendation! Lmtco back

## 2025-07-07 NOTE — PROGRESS NOTES
"Subjective   Patient ID: Mayra Coronado is a 25 y.o. female who presents for Rash (On stomach/bilateral arms and legs x 4 days).  History of Present Illness  aMyra Coronado is a 25 year old pregnant female in her third trimester who presents with a widespread rash. She was referred by her OB to rule out PUPPP rash. The rash initially appeared on her stomach, which she initially thought was due to itchy and inflamed stretch marks. It has since spread to her legs and arms.    There have been no recent changes in soap, detergents, or exposure to new pets or plants. The rash is tender to touch, especially on the stretch marks, she has not taken any medication for it today.  \    ROS is negative unless otherwise stated in HPI.       Objective     /86 (BP Location: Left arm, Patient Position: Sitting)   Pulse 108   Temp 36.8 °C (98.2 °F) (Oral)   Resp 18   Ht 1.626 m (5' 4\")   Wt 89.8 kg (198 lb)   LMP 11/05/2024   SpO2 100%   BMI 33.99 kg/m²        VS: As documented in the triage note and EMR flowsheet from this visit was reviewed  General: Well appearing. No acute distress.   Eyes:  Extraocular movements grossly intact. No scleral icterus.   Head: Atraumatic. Normocephalic.     Neck: No meningismus. No gross masses. Full movement through range of motion  ENT: Posterior oropharynx shows no erythema, exudate or edema.  Uvula is midline without edema.  No stridor or trismus  CV: Regular rhythm. No murmurs, rubs, gallops appreciated.   Resp: Clear to auscultation bilaterally. No respiratory distress.    GI: Nontender. Soft. No masses. No rebound, rigidity or guarding.   MSK: Symmetric muscle bulk. No gross step offs or deformities.  Skin: Warm, dry.  Erythematous, maculopapular rash stretch marks of the abdomen.  Umbilicus is spared.  Small amounts macules and papules noted diffusely to the bilateral ankles and upper extremities.  No lesions on the palms or soles.  Neuro: CN II-VII intact. A&O x3. Speech fluent. " Alert. Moving all extremities. Ambulates with normal gait  Psych: Appropriate mood and affect for situation    Point of Care Test & Imaging Results from this visit  No results found for this visit on 07/07/25.   Imaging  No results found.    Cardiology, Vascular, and Other Imaging  No other imaging results found for the past 2 days      Diagnostic study results (if any) were reviewed by Shauna Keller PA-C.    Assessment/Plan   Allergies, medications, history, and pertinent labs/EKGs/Imaging reviewed by Shauna Keller PA-C.     Assessment & Plan  Patient is a 25-year-old female who is pregnant in her third trimester presents for rash. The rash began on the abdomen and spread to the extremities.  States that this is very pruritic.  There have been no new exposures to potential allergens like soaps, detergents, pets, or plants.  Skin exam findings as noted above.  Vitals are stable.  No systemic symptoms or intraoral lesions.  Rash does appear to be consistent with mild PUPPP.  Will prescribe triamcinolone ointment.  Patient advised on topical or oral antihistamine for itching.  She has upcoming follow-up appointment with her OB/GYN in 1 week.    Orders and Diagnoses  Diagnoses and all orders for this visit:  Skin eruption  -     triamcinolone (Kenalog) 0.1 % cream; Apply topically 2 times a day.  Pregnancy, unspecified gestational age ([facility])        Disposition:  Home      Shauna Keller PA-C     This medical note was created with the assistance of artificial intelligence (AI) for documentation purposes. The content has been reviewed and confirmed by the healthcare provider for accuracy and completeness. Patient consented to the use of audio recording and use of AI during their visit.

## 2025-07-07 NOTE — PATIENT COMMUNICATION
Pt can go to urgent care for eval; may be PUPPS but not typical look; verify no itching on palms/soles of feet please

## 2025-07-08 ENCOUNTER — TELEPHONE (OUTPATIENT)
Dept: URGENT CARE | Age: 25
End: 2025-07-08

## 2025-07-09 ENCOUNTER — OFFICE VISIT (OUTPATIENT)
Dept: URGENT CARE | Age: 25
End: 2025-07-09
Payer: MEDICAID

## 2025-07-09 ENCOUNTER — ANCILLARY PROCEDURE (OUTPATIENT)
Dept: RADIOLOGY | Age: 25
End: 2025-07-09
Payer: MEDICAID

## 2025-07-09 VITALS — BODY MASS INDEX: 33.8 KG/M2 | WEIGHT: 198 LBS | HEIGHT: 64 IN

## 2025-07-09 DIAGNOSIS — Z34.90 PRENATAL CARE, ANTEPARTUM: ICD-10-CM

## 2025-07-09 DIAGNOSIS — O36.60X0 LGA (LARGE FOR GESTATIONAL AGE) FETUS AFFECTING MANAGEMENT OF MOTHER (HHS-HCC): ICD-10-CM

## 2025-07-09 DIAGNOSIS — O26.86 PUPP (PRURITIC URTICARIAL PAPULES AND PLAQUES OF PREGNANCY) (HHS-HCC): Primary | ICD-10-CM

## 2025-07-09 DIAGNOSIS — O99.213 OBESITY AFFECTING PREGNANCY IN THIRD TRIMESTER (HHS-HCC): ICD-10-CM

## 2025-07-09 DIAGNOSIS — Z82.79 FAMILY HISTORY OF CONGENITAL HEART DEFECT: ICD-10-CM

## 2025-07-09 PROCEDURE — 76816 OB US FOLLOW-UP PER FETUS: CPT

## 2025-07-09 PROCEDURE — 76819 FETAL BIOPHYS PROFIL W/O NST: CPT

## 2025-07-09 PROCEDURE — 76816 OB US FOLLOW-UP PER FETUS: CPT | Performed by: STUDENT IN AN ORGANIZED HEALTH CARE EDUCATION/TRAINING PROGRAM

## 2025-07-09 PROCEDURE — 76819 FETAL BIOPHYS PROFIL W/O NST: CPT | Performed by: STUDENT IN AN ORGANIZED HEALTH CARE EDUCATION/TRAINING PROGRAM

## 2025-07-09 RX ORDER — CLOBETASOL PROPIONATE 0.5 MG/G
OINTMENT TOPICAL 2 TIMES DAILY
Qty: 30 G | Refills: 0 | Status: SHIPPED | OUTPATIENT
Start: 2025-07-09

## 2025-07-09 NOTE — PROGRESS NOTES
"Subjective   Patient ID: Mayra Coronado is a 25 y.o. female. They present today with a chief complaint of Rash (Patient was seen here 2 days ago for this rash. Patient states that the rash has gotten worse.).    History of Present Illness  HPI  Patient  at 35w1d gestation presents for complaints of worsening rash to abdomen and now thighs and tops of feet. Patient was diagnosed with PUPPPS and prescribed triamcinolone 2 days ago. Patient states rash is worsening and her OB wanted her examined to r/o other dermatological manifestations. Patient denies fevers, chills, malaise.     Past Medical History  Allergies as of 2025 - Reviewed 2025   Allergen Reaction Noted    Adhesive tape-silicones Rash 2025    Amoxicillin Hives, Itching, and Rash 2016       Prescriptions Prior to Admission[1]     Medical History[2]    Surgical History[3]     reports that she has never smoked. She has never used smokeless tobacco. She reports that she does not currently use alcohol. She reports current drug use. Drug: Marijuana.    Review of Systems  Review of Systems  ROS is negative unless otherwise stated in HPI.                                Objective    Vitals:    25 1438   Weight: 89.8 kg (198 lb)   Height: 1.626 m (5' 4\")     Patient's last menstrual period was 2024.    Physical Exam  VS: As documented in the triage note and EMR flowsheet from this visit was reviewed  General: Well appearing. No acute distress.   Eyes:  Extraocular movements grossly intact. No scleral icterus.   Head: Atraumatic. Normocephalic.     Neck: No meningismus. No gross masses. Full movement through range of motion  MSK: Symmetric muscle bulk. No gross step offs or deformities.  Skin: Warm, dry. To the abdomen there are erythematous plaques in the distribution of striae on abdomen sparing the umbilicus. No open wounds present. No streaking or signs of infection.   Neuro: CN II-VII intact. A&O x3. Speech fluent. Alert. " Moving all extremities. Ambulates with normal gait  Psych: Appropriate mood and affect for situation    Procedures    Point of Care Test & Imaging Results from this visit  No results found for this visit on 25.   Imaging  No results found.    Cardiology, Vascular, and Other Imaging  US OB follow UP transabdominal approach  Result Date: 2025  Interpreted by: Zara Duff Indication ======== Suspected Problem with Fetal Growth , Family history of congenital cardiac disease , Class I Obesity (BMI 30-34.9) History ====== General History Height 163 cm Height (ft) 5 ft Height (in) 4 in Previous Outcomes  1 Para 0 Maternal Assessment ================= Height 163 cm Height (ft) 5 ft Height (in) 4 in Weight 80 kg Weight (lb) 176 lb Weight gain 0 kg Weight gain (lb) 0 lb BMI 30.21 kg/mï¿½ Physical Exam Initial weight (lb) 176 lb Pregnancy ========= Rodas pregnancy. Number of fetuses: 1 Dating ====== LMP on: 2024 GA by LMP 35 w + 1 d MUSA by LMP: 2025 Conception: LMP GA by prior assessment 35 w + 1 d MUSA by prior assessment: 2025 Ultrasound examination on: 2025 GA by U/S based upon: AC, BPD, Femur, HC GA by U/S 37 w + 5 d MUSA by U/S: 2025 Assigned: based on the LMP, selected on 2025 Assigned GA 35 w + 1 d Assigned MUSA: 2025 Fetal Growth Overview ================= Exam date        GA              BPD (mm)            HC (mm)              AC (mm)                FL (mm)             HL (mm)         EFW (g) 2025          22w 1d        54.2    61%          210.6     78%        178        60%         41.3    80%                             553     83% 2025          35w 1d        96.1    >99%        331.7     81%        344.7    >99%        68.3     41%                             3274    97% Impression ========= Patient presents for an evaluation of fetal growth and wellbeing in a pregnancy notable for BMI 30. -Increased interval fetal growth: EFW is 3274 g or 97% -The  AC is >99% -The amniotic fluid is normal -BPP 8/8 -No malformations by limited survey Thank you for allowing us to participate in your patient's care Follow-up ======== Follow-up as clinically indicated General Evaluation ============== Cardiac activity present.  bpm. Fetal movements: visualized. Presentation: cephalic Placenta: Placental site: posterior, No Previa Seen Umbilical cord: Cord vessels: 3 vessel cord Amniotic fluid: MVP 5.1 cm. ERIKA 17.4 cm. Q1 5.1 cm, Q2 4.7 cm, Q3 4.2 cm, Q4 3.4 cm Fetal Biometry ============ Standard BPD 96.1 mm 39w 2d >99% Hadlock .2 mm  80% INTERGROWTH-21st .7 mm 37w 6d 81% Hadlock Cerebellum tr 48.5 mm 35w 4d 82% Stanford .7 mm 38w 3d >99% Hadlock Femur 68.3 mm 35w 1d 41% Hadlock HC / AC 0.96 EFW 3,274 g 38w 1d 97% Hadlock EFW (lb) 7 lb EFW (oz) 4 oz EFW by: Hadlock (BPD-HC-AC-FL) Extended  5.7 mm Head / Face / Neck Cephalic index 0.84  81% Nicolaides Extremities / Bony Struc FL / BPD 0.71 FL / HC 0.21 FL / AC 0.20 Other Structures  bpm Fetal Anatomy =========== Cranium: Normal Lateral ventricles: Normal Midline falx: Normal Cavum septi pellucidi: Normal Cerebellum: Normal Cisterna magna: Normal Head / Neck Rt lateral ventricle: Normal Lt lateral ventricle: Normal Thalami: Normal Cerebellar lobes: Normal 4-chamber view: suboptimal Heart / Thorax Cardiac axis: Normal Diaphragm: Normal Stomach: Normal Kidneys: Normal Bladder: visualized Abdomen Stomach: correct situs Rt kidney: Normal Lt kidney: Normal Large bowel: Normal Fetal sex: male Wants to know fetal sex: yes Biophysical Profile ============== 2: Fetal breathing movements 2: Gross body movements 2: Fetal tone 2: Amniotic fluid volume 8/8 Biophysical profile score Maternal Structures =============== Uterus / Cervix Uterus: Visualized Cervix: Not visualized Ovaries / Tubes / Adnexa Rt ovary: Visualized Lt ovary: Visualized        Diagnostic study results (if any) were reviewed by Jill Brand,  ROMERO.    Assessment/Plan   Allergies, medications, history, and pertinent labs/EKGs/Imaging reviewed by Jill Brand PA-C.     Medical Decision Making  The patient was evaluated for above complaints in [facility]. Differential diagnoses include PUPPPS and intrahepatic cholestasis of pregnancy . The patient has no involvement of palms and soles of feet, this is consistent with previously diagnosed PUPPPS. Patient educated on treatment plan consisting of high potency topical steroid-clobetasol. Patient provided with return precaution. Follow up with OB as scheduled.     Orders and Diagnoses  Diagnoses and all orders for this visit:  PUPP (pruritic urticarial papules and plaques of pregnancy) ([facility])  -     clobetasol (Temovate) 0.05 % ointment; Apply topically 2 times a day. Do not apply to face or neck.      Medical Admin Record      Patient disposition: Home    Electronically signed by Jill Brand PA-C  2:52 PM           [1] (Not in a hospital admission)   [2]   Past Medical History:  Diagnosis Date    Acute pharyngitis, unspecified 04/06/2015    Sore throat    Anxiety     Depression     Encounter for immunization 10/22/2015    Need for vaccination    Mydriasis 08/21/2014    Pupillary dilation    Pain in unspecified hip     Joint pain, hip    Personal history of diseases of the skin and subcutaneous tissue 08/21/2014    History of acne    Personal history of other diseases of the respiratory system 04/06/2015    History of acute pharyngitis    Personal history of other specified conditions 08/17/2013    History of diarrhea    Varicella    [3] No past surgical history on file.

## 2025-07-10 ENCOUNTER — TELEPHONE (OUTPATIENT)
Dept: URGENT CARE | Age: 25
End: 2025-07-10

## 2025-07-12 NOTE — PROGRESS NOTES
Subjective   Patient ID [id number]   Mayra Coronado is a 25 y.o.  at 35w6d with a working estimated date of delivery of 2025, by Last Menstrual Period who presents for a routine prenatal visit.     She was recently diagnosed with PUPPS for which she is taking topical cream as prescribed. She denies vaginal bleeding, leakage of fluid, decreased fetal movements, or contractions.    Her pregnancy is notable for:  -Initial UDS +THC, pt already stopped, repeat at 36wks  -Normal fetal echo  -Last growth US 35wk US EFW 97%tile, AC >99%tile, vtx, bonny 17.4    Objective   Physical Exam:   Weight: 92.6 kg (204 lb 3.2 oz)  Pregravid BMI: 27.11  BP: 124/86  Fetal Heart Rate: 125 Fundal Height (cm): 36 cm             Prenatal Labs  Urine Dip  Results for orders placed or performed in visit on 25   POCT UA Automated manually resulted    Collection Time: 25 10:54 AM   Result Value Ref Range    POC Color, Urine Yellow Straw, Yellow, Light-Yellow    POC Appearance, Urine Clear Clear    POC Glucose, Urine NEGATIVE NEGATIVE mg/dl    POC Bilirubin, Urine NEGATIVE NEGATIVE    POC Ketones, Urine NEGATIVE NEGATIVE mg/dl    POC Specific Gravity, Urine 1.025 1.005 - 1.035    POC Blood, Urine NEGATIVE NEGATIVE    POC PH, Urine 6.5 No Reference Range Established PH    POC Protein, Urine 30 (1+) (A) NEGATIVE mg/dl    POC Urobilinogen, Urine 0.2 0.2, 1.0 EU/DL    Poc Nitrite, Urine NEGATIVE NEGATIVE    POC Leukocytes, Urine SMALL (1+) (A) NEGATIVE     Assessment/Plan     25 y.o.  at 35w6d , ROSIE  Assessment & Plan  Supervision of normal first pregnancy, antepartum ([facility])  -Continue prenatal vitamin, bASA  -Labs reviewed  -GBS swab today  -UDS swab today  -Labor precautions and fetal kick counts reviewed  -Return precautions/when to call discussed thoroughly  -Follow up in 1 week for a routine prenatal visit  Orders:    POCT UA Automated manually resulted    Group B Streptococcus (GBS) Prenatal Screen,  Culture    Tetrahydrocannabinol (THC) use disorder, mild, in early remission, abuse  -Repeat drug screen today  -Discussed with patient and she is agreeable to this plan of care  Orders:    Drug Screen, Urine With Reflex to Confirmation    35 weeks gestation of pregnancy ([facility])           Rashad Beard MD

## 2025-07-14 ENCOUNTER — ROUTINE PRENATAL (OUTPATIENT)
Dept: OBSTETRICS AND GYNECOLOGY | Facility: CLINIC | Age: 25
End: 2025-07-14
Payer: MEDICAID

## 2025-07-14 VITALS — DIASTOLIC BLOOD PRESSURE: 86 MMHG | WEIGHT: 204.2 LBS | BODY MASS INDEX: 35.05 KG/M2 | SYSTOLIC BLOOD PRESSURE: 124 MMHG

## 2025-07-14 DIAGNOSIS — Z3A.35 35 WEEKS GESTATION OF PREGNANCY (HHS-HCC): ICD-10-CM

## 2025-07-14 DIAGNOSIS — F12.11 TETRAHYDROCANNABINOL (THC) USE DISORDER, MILD, IN EARLY REMISSION, ABUSE: ICD-10-CM

## 2025-07-14 DIAGNOSIS — Z34.00 SUPERVISION OF NORMAL FIRST PREGNANCY, ANTEPARTUM (HHS-HCC): Primary | ICD-10-CM

## 2025-07-14 PROCEDURE — 81003 URINALYSIS AUTO W/O SCOPE: CPT | Mod: QW | Performed by: STUDENT IN AN ORGANIZED HEALTH CARE EDUCATION/TRAINING PROGRAM

## 2025-07-14 PROCEDURE — 99213 OFFICE O/P EST LOW 20 MIN: CPT | Performed by: STUDENT IN AN ORGANIZED HEALTH CARE EDUCATION/TRAINING PROGRAM

## 2025-07-14 PROCEDURE — 99213 OFFICE O/P EST LOW 20 MIN: CPT | Mod: TH | Performed by: STUDENT IN AN ORGANIZED HEALTH CARE EDUCATION/TRAINING PROGRAM

## 2025-07-14 ASSESSMENT — ENCOUNTER SYMPTOMS
DEPRESSION: 0
OCCASIONAL FEELINGS OF UNSTEADINESS: 0
LOSS OF SENSATION IN FEET: 0

## 2025-07-14 ASSESSMENT — PAIN SCALES - GENERAL: PAINLEVEL_OUTOF10: 0 - NO PAIN

## 2025-07-14 ASSESSMENT — PAIN - FUNCTIONAL ASSESSMENT: PAIN_FUNCTIONAL_ASSESSMENT: 0-10

## 2025-07-15 LAB
AMPHETAMINES UR QL: NEGATIVE NG/ML
BARBITURATES UR QL: NEGATIVE NG/ML
BENZODIAZ UR QL: NEGATIVE NG/ML
BZE UR QL: NEGATIVE NG/ML
CREAT UR-MCNC: 149.5 MG/DL
FENTANYL UR QL SCN: NEGATIVE NG/ML
METHADONE UR QL: NEGATIVE NG/ML
OPIATES UR QL: NEGATIVE NG/ML
OXIDANTS UR QL: NEGATIVE MCG/ML
OXYCODONE UR QL: NEGATIVE NG/ML
PCP UR QL: NEGATIVE NG/ML
PH UR: 7.1 [PH] (ref 4.5–9)
QUEST NOTES AND COMMENTS: NORMAL
THC UR QL: NEGATIVE NG/ML

## 2025-07-19 LAB — GP B STREP SPEC QL CULT: ABNORMAL

## 2025-07-22 ENCOUNTER — ROUTINE PRENATAL (OUTPATIENT)
Dept: OBSTETRICS AND GYNECOLOGY | Facility: CLINIC | Age: 25
End: 2025-07-22
Payer: MEDICAID

## 2025-07-22 VITALS — SYSTOLIC BLOOD PRESSURE: 137 MMHG | WEIGHT: 209 LBS | DIASTOLIC BLOOD PRESSURE: 87 MMHG | BODY MASS INDEX: 35.87 KG/M2

## 2025-07-22 DIAGNOSIS — Z34.00 SUPERVISION OF NORMAL FIRST PREGNANCY, ANTEPARTUM (HHS-HCC): Primary | ICD-10-CM

## 2025-07-22 DIAGNOSIS — Z3A.37 37 WEEKS GESTATION OF PREGNANCY (HHS-HCC): ICD-10-CM

## 2025-07-22 DIAGNOSIS — O12.03 SWELLING OF LOWER EXTREMITY DURING PREGNANCY IN THIRD TRIMESTER: ICD-10-CM

## 2025-07-22 DIAGNOSIS — O26.86 PUPP (PRURITIC URTICARIAL PAPULES AND PLAQUES OF PREGNANCY) (HHS-HCC): ICD-10-CM

## 2025-07-22 LAB
POC APPEARANCE, URINE: CLEAR
POC BILIRUBIN, URINE: NEGATIVE
POC BLOOD, URINE: NEGATIVE
POC COLOR, URINE: YELLOW
POC GLUCOSE, URINE: NEGATIVE MG/DL
POC KETONES, URINE: NEGATIVE MG/DL
POC LEUKOCYTES, URINE: ABNORMAL
POC NITRITE,URINE: NEGATIVE
POC PH, URINE: 7 PH
POC PROTEIN, URINE: NEGATIVE MG/DL
POC SPECIFIC GRAVITY, URINE: 1.01
POC UROBILINOGEN, URINE: 0.2 EU/DL

## 2025-07-22 PROCEDURE — 99213 OFFICE O/P EST LOW 20 MIN: CPT | Mod: TH | Performed by: OBSTETRICS & GYNECOLOGY

## 2025-07-22 PROCEDURE — 81003 URINALYSIS AUTO W/O SCOPE: CPT | Performed by: OBSTETRICS & GYNECOLOGY

## 2025-07-22 PROCEDURE — 99213 OFFICE O/P EST LOW 20 MIN: CPT | Performed by: OBSTETRICS & GYNECOLOGY

## 2025-07-22 NOTE — PROGRESS NOTES
"Subjective   Patient ID [id number]   Mayra Coronado is a 25 y.o.  at 37w0d with a working estimated date of delivery of 2025, by Last Menstrual Period who presents for a routine prenatal visit. She denies vaginal bleeding, leakage of fluid, decreased fetal movements, or contractions.    Her pregnancy is complicated by:  Initial UDS +THC, pt already stopped, repeat at 36wks, neg  FOB sister w/\"hole in heart\", fetal echo at 22wks wnl  35wk US EFW 97%tile, AC >99%tile, vtx, bonny 17.4: aware of labor, shoulder dystocia w/transient/permanaent fetal/neurolgic impairment  PUPPS rash, topical steroid helping  Wants IOL arou d 39wks    Objective   Physical Exam:   Weight: 94.8 kg (209 lb)    Pregravid BMI: 27.11    BP: 138/89 137/87 recheck  Fetal Heart Rate: 125 Fundal Height (cm): 39 cm Presentation: Vertex  Dilation: 1 Effacement (%): 60 Fetal Station: -2    Prenatal Labs  Urine Dip  Results for orders placed or performed in visit on 25   POCT UA Automated manually resulted    Collection Time: 25 10:54 AM   Result Value Ref Range    POC Color, Urine Yellow Straw, Yellow, Light-Yellow    POC Appearance, Urine Clear Clear    POC Glucose, Urine NEGATIVE NEGATIVE mg/dl    POC Bilirubin, Urine NEGATIVE NEGATIVE    POC Ketones, Urine NEGATIVE NEGATIVE mg/dl    POC Specific Gravity, Urine 1.025 1.005 - 1.035    POC Blood, Urine NEGATIVE NEGATIVE    POC PH, Urine 6.5 No Reference Range Established PH    POC Protein, Urine 30 (1+) (A) NEGATIVE mg/dl    POC Urobilinogen, Urine 0.2 0.2, 1.0 EU/DL    Poc Nitrite, Urine NEGATIVE NEGATIVE    POC Leukocytes, Urine SMALL (1+) (A) NEGATIVE   Group B Streptococcus (GBS) Prenatal Screen, Culture    Collection Time: 25 11:01 AM    Specimen: Vaginal/Rectal; Swab   Result Value Ref Range    STREPTOCOCCUS, GROUP B CULTURE SEE NOTE (A)    Drug Screen, Urine With Reflex to Confirmation    Collection Time: 25 11:04 AM   Result Value Ref Range    Fentanyl NEGATIVE " <0.5 ng/mL    Amphetamines NEGATIVE <500 ng/mL    Barbiturates NEGATIVE <300 ng/mL    Benzodiazepines NEGATIVE <100 ng/mL    Cocaine Metabolite NEGATIVE <150 ng/mL    Marijuana Metabolite NEGATIVE <20 ng/mL    Methadone Metabolite NEGATIVE <100 ng/mL    Opiates NEGATIVE <100 ng/mL    Oxycodone NEGATIVE <100 ng/mL    Phencyclidine NEGATIVE <25 ng/mL    Creatinine 149.5 > or = 20.0 mg/dL    pH 7.1 4.5 - 9.0    Oxidant NEGATIVE <200 mcg/mL    Notes and Comments       Imaging   fev'd    Assessment/Plan   Problem List Items Addressed This Visit    None  Visit Diagnoses         Codes      Supervision of normal first pregnancy, antepartum ([facility])    -  Primary Z34.00      Swelling of lower extremity during pregnancy in third trimester     O12.03    Relevant Orders    POCT UA Automated manually resulted      PUPP (pruritic urticarial papules and plaques of pregnancy) ([facility])     O26.86      37 weeks gestation of pregnancy ([facility])     Z3A.37          Continue prenatal vitamin.  Labs reviewed.  Labor precautions given.  Movement counts  Follow up in 1 week for a routine prenatal visit.

## 2025-07-27 ENCOUNTER — HOSPITAL ENCOUNTER (OUTPATIENT)
Facility: HOSPITAL | Age: 25
Discharge: HOME | End: 2025-07-27
Attending: OBSTETRICS & GYNECOLOGY | Admitting: OBSTETRICS & GYNECOLOGY
Payer: MEDICAID

## 2025-07-27 VITALS
TEMPERATURE: 98.6 F | BODY MASS INDEX: 35.58 KG/M2 | WEIGHT: 208.4 LBS | HEIGHT: 64 IN | SYSTOLIC BLOOD PRESSURE: 132 MMHG | OXYGEN SATURATION: 98 % | DIASTOLIC BLOOD PRESSURE: 65 MMHG | HEART RATE: 104 BPM | RESPIRATION RATE: 18 BRPM

## 2025-07-27 LAB
ALBUMIN SERPL BCP-MCNC: 3.1 G/DL (ref 3.4–5)
ALP SERPL-CCNC: 170 U/L (ref 33–110)
ALT SERPL W P-5'-P-CCNC: 8 U/L (ref 7–45)
ANION GAP SERPL CALCULATED.3IONS-SCNC: 12 MMOL/L (ref 10–20)
AST SERPL W P-5'-P-CCNC: 16 U/L (ref 9–39)
BILIRUB SERPL-MCNC: 0.3 MG/DL (ref 0–1.2)
BILIRUBIN, POC: NORMAL
BLOOD URINE, POC: NORMAL
BUN SERPL-MCNC: 10 MG/DL (ref 6–23)
CALCIUM SERPL-MCNC: 8.7 MG/DL (ref 8.6–10.3)
CHLORIDE SERPL-SCNC: 108 MMOL/L (ref 98–107)
CLARITY, POC: NORMAL
CO2 SERPL-SCNC: 21 MMOL/L (ref 21–32)
COLOR, POC: NORMAL
CREAT SERPL-MCNC: 0.6 MG/DL (ref 0.5–1.05)
CREAT UR-MCNC: 247.6 MG/DL (ref 20–320)
EGFRCR SERPLBLD CKD-EPI 2021: >90 ML/MIN/1.73M*2
ERYTHROCYTE [DISTWIDTH] IN BLOOD BY AUTOMATED COUNT: 13.4 % (ref 11.5–14.5)
GLUCOSE SERPL-MCNC: 102 MG/DL (ref 74–99)
GLUCOSE URINE, POC: NORMAL
HCT VFR BLD AUTO: 37.4 % (ref 36–46)
HGB BLD-MCNC: 12.8 G/DL (ref 12–16)
KETONES, POC: NORMAL
LDH SERPL L TO P-CCNC: 194 U/L (ref 84–246)
LEUKOCYTE EST, POC: NORMAL
MCH RBC QN AUTO: 31.2 PG (ref 26–34)
MCHC RBC AUTO-ENTMCNC: 34.2 G/DL (ref 32–36)
MCV RBC AUTO: 91 FL (ref 80–100)
NITRITE, POC: NORMAL
NRBC BLD-RTO: 0 /100 WBCS (ref 0–0)
PH, POC: NORMAL
PLATELET # BLD AUTO: 248 X10*3/UL (ref 150–450)
POTASSIUM SERPL-SCNC: 3.8 MMOL/L (ref 3.5–5.3)
PROT SERPL-MCNC: 6 G/DL (ref 6.4–8.2)
PROT UR-MCNC: 70 MG/DL (ref 5–24)
PROT/CREAT UR: 0.28 MG/MG CREAT (ref 0–0.17)
RBC # BLD AUTO: 4.1 X10*6/UL (ref 4–5.2)
SODIUM SERPL-SCNC: 137 MMOL/L (ref 136–145)
SPECIFIC GRAVITY, POC: NORMAL
URATE SERPL-MCNC: 5.8 MG/DL (ref 2.3–6.7)
URINE PROTEIN, POC: NORMAL
UROBILINOGEN, POC: NORMAL
WBC # BLD AUTO: 9.8 X10*3/UL (ref 4.4–11.3)

## 2025-07-27 PROCEDURE — 36415 COLL VENOUS BLD VENIPUNCTURE: CPT | Performed by: STUDENT IN AN ORGANIZED HEALTH CARE EDUCATION/TRAINING PROGRAM

## 2025-07-27 PROCEDURE — 80053 COMPREHEN METABOLIC PANEL: CPT | Performed by: STUDENT IN AN ORGANIZED HEALTH CARE EDUCATION/TRAINING PROGRAM

## 2025-07-27 PROCEDURE — 99213 OFFICE O/P EST LOW 20 MIN: CPT

## 2025-07-27 PROCEDURE — 81002 URINALYSIS NONAUTO W/O SCOPE: CPT | Performed by: STUDENT IN AN ORGANIZED HEALTH CARE EDUCATION/TRAINING PROGRAM

## 2025-07-27 PROCEDURE — 84156 ASSAY OF PROTEIN URINE: CPT | Performed by: STUDENT IN AN ORGANIZED HEALTH CARE EDUCATION/TRAINING PROGRAM

## 2025-07-27 PROCEDURE — 36415 COLL VENOUS BLD VENIPUNCTURE: CPT

## 2025-07-27 PROCEDURE — 83615 LACTATE (LD) (LDH) ENZYME: CPT | Performed by: STUDENT IN AN ORGANIZED HEALTH CARE EDUCATION/TRAINING PROGRAM

## 2025-07-27 PROCEDURE — 85027 COMPLETE CBC AUTOMATED: CPT | Performed by: STUDENT IN AN ORGANIZED HEALTH CARE EDUCATION/TRAINING PROGRAM

## 2025-07-27 PROCEDURE — 84550 ASSAY OF BLOOD/URIC ACID: CPT | Performed by: STUDENT IN AN ORGANIZED HEALTH CARE EDUCATION/TRAINING PROGRAM

## 2025-07-27 RX ORDER — LIDOCAINE HYDROCHLORIDE 10 MG/ML
0.5 INJECTION, SOLUTION EPIDURAL; INFILTRATION; INTRACAUDAL; PERINEURAL ONCE AS NEEDED
Status: DISCONTINUED | OUTPATIENT
Start: 2025-07-27 | End: 2025-07-27 | Stop reason: HOSPADM

## 2025-07-27 RX ORDER — HYDRALAZINE HYDROCHLORIDE 20 MG/ML
5 INJECTION INTRAMUSCULAR; INTRAVENOUS ONCE AS NEEDED
Status: DISCONTINUED | OUTPATIENT
Start: 2025-07-27 | End: 2025-07-27 | Stop reason: HOSPADM

## 2025-07-27 RX ORDER — ONDANSETRON HYDROCHLORIDE 2 MG/ML
4 INJECTION, SOLUTION INTRAVENOUS EVERY 6 HOURS PRN
Status: DISCONTINUED | OUTPATIENT
Start: 2025-07-27 | End: 2025-07-27 | Stop reason: HOSPADM

## 2025-07-27 RX ORDER — LABETALOL HYDROCHLORIDE 5 MG/ML
20 INJECTION, SOLUTION INTRAVENOUS ONCE AS NEEDED
Status: DISCONTINUED | OUTPATIENT
Start: 2025-07-27 | End: 2025-07-27 | Stop reason: HOSPADM

## 2025-07-27 RX ORDER — ONDANSETRON 4 MG/1
4 TABLET, FILM COATED ORAL EVERY 6 HOURS PRN
Status: DISCONTINUED | OUTPATIENT
Start: 2025-07-27 | End: 2025-07-27 | Stop reason: HOSPADM

## 2025-07-27 ASSESSMENT — PAIN SCALES - GENERAL: PAINLEVEL_OUTOF10: 0 - NO PAIN

## 2025-07-28 ENCOUNTER — TELEPHONE (OUTPATIENT)
Dept: OBSTETRICS AND GYNECOLOGY | Facility: CLINIC | Age: 25
End: 2025-07-28
Payer: MEDICAID

## 2025-07-28 NOTE — H&P
OB Triage H&P    Assessment/Plan    Mayra Coronado is a 25 y.o.  at 37w5d, MUSA: 2025, by Last Menstrual Period, who presents to triage with elevated BP at home.    Plan  Normal BP at home.  No s/sx of PEC. Normal labs.   Plan discharge home but pt aware to call back to her OB if any change in status, and to continue home BP monitoring  -Fetal monitoring reassuring  -Good fetal movement  -Up to date on prenatal care  -Continue routine prenatal care    Dispo  -Patient appropriate for discharge home, agrees with plan  -Return precautions discussed   -Follow up at next scheduled OB appointment or to triage sooner as needed        Pregnancy Problems (from 25 to present)       No problems associated with this episode.            Subjective   Pt here due to elevated BP on home cuff.   No HA, no vision changes, no RUQ pain.   Good FM, irregular cramping, no LOF, no VB    Prenatal Provider Delio    OB History    Para Term  AB Living   1 0 0 0 0 0   SAB IAB Ectopic Multiple Live Births   0 0 0 0 0      # Outcome Date GA Lbr Robert/2nd Weight Sex Type Anes PTL Lv   1 Current                Surgical History[1]    Social History     Tobacco Use    Smoking status: Never    Smokeless tobacco: Never   Substance Use Topics    Alcohol use: Not Currently     Comment: pregnant       Allergies[2]    Prescriptions Prior to Admission[3]  Objective     Last Vitals  Temp Pulse Resp BP MAP O2 Sat   37 °C (98.6 °F) (!) 114 18 132/65 90 98 %     Blood Pressures         2025  1944 2025    BP: 135/81 125/74 128/74 128/78 132/65             Physical Exam  General: NAD, mood appropriate  Cardiopulmonary: warm and well perfused, breathing comfortably on room air  Abdomen: Gravid, non-tender  Extremities: Symmetric, 1+ bilaterally  Speculum Exam: deferred  Cervix:   /  /  def    Chaperone Present: Yes.  Chaperone Name/Title: RN  Examination Chaperoned:  Entire Physical Exam     Fetal Monitoring  Baseline: 125 bpm, Variability: moderate,  Accelerations: present and Decelerations: none  Uterine Activity: No contractions seen on toco  Interpretation: Reactive    Bedside ultrasound: No    Labs in chart were reviewed.  CBC   Recent Labs     07/27/25 1953   WBC 9.8   HGB 12.8   HCT 37.4        CMP   Recent Labs     07/27/25 1953      K 3.8   *   CO2 21   ANIONGAP 12   BUN 10   CREATININE 0.60   EGFR >90   CALCIUM 8.7   ALBUMIN 3.1*   PROT 6.0*   ALKPHOS 170*   ALT 8   AST 16   BILITOT 0.3     PCR   Recent Labs     07/27/25 1953   TPUC 70*   CREATU 247.6   UTPCR 0.28*      Results from last 7 days   Lab Units 07/27/25 1953   WBC AUTO x10*3/uL 9.8   HEMOGLOBIN g/dL 12.8   HEMATOCRIT % 37.4   PLATELETS AUTO x10*3/uL 248   AST U/L 16   ALT U/L 8   CREATININE mg/dL 0.60        Prenatal labs reviewed, not remarkable.             [1] History reviewed. No pertinent surgical history.  [2]   Allergies  Allergen Reactions    Adhesive Tape-Silicones Rash    Amoxicillin Itching and Rash   [3]   Medications Prior to Admission   Medication Sig Dispense Refill Last Dose/Taking    DULoxetine (Cymbalta) 60 mg DR capsule Take 30 mg by mouth once daily. Per pt, dose changed during early pregnancy   7/26/2025 Bedtime    famotidine (Pepcid) 20 mg tablet Take by mouth.   7/26/2025 Bedtime    prenatal no115/iron/folic acid (PRENATAL 19 ORAL) Take by mouth.   7/26/2025 Evening    clobetasol (Temovate) 0.05 % ointment Apply topically 2 times a day. Do not apply to face or neck. 30 g 0     diphenhydrAMINE (BENADryl) 25 mg capsule Take by mouth. (Patient not taking: Reported on 7/7/2025)       diphenhydrAMINE (BENADryl) 25 mg capsule Take by mouth.       triamcinolone (Kenalog) 0.1 % cream Apply topically 2 times a day. 30 g 0

## 2025-07-30 ENCOUNTER — HOSPITAL ENCOUNTER (INPATIENT)
Facility: HOSPITAL | Age: 25
LOS: 4 days | Discharge: HOME | End: 2025-08-03
Attending: OBSTETRICS & GYNECOLOGY | Admitting: OBSTETRICS & GYNECOLOGY
Payer: MEDICAID

## 2025-07-30 ENCOUNTER — ANESTHESIA (OUTPATIENT)
Dept: OBSTETRICS AND GYNECOLOGY | Facility: HOSPITAL | Age: 25
End: 2025-07-30
Payer: MEDICAID

## 2025-07-30 ENCOUNTER — APPOINTMENT (OUTPATIENT)
Dept: OBSTETRICS AND GYNECOLOGY | Facility: CLINIC | Age: 25
End: 2025-07-30
Payer: MEDICAID

## 2025-07-30 ENCOUNTER — ANESTHESIA EVENT (OUTPATIENT)
Dept: OBSTETRICS AND GYNECOLOGY | Facility: HOSPITAL | Age: 25
End: 2025-07-30
Payer: MEDICAID

## 2025-07-30 DIAGNOSIS — O14.03 MILD PRE-ECLAMPSIA IN THIRD TRIMESTER (HHS-HCC): ICD-10-CM

## 2025-07-30 PROBLEM — Z3A.38 38 WEEKS GESTATION OF PREGNANCY (HHS-HCC): Status: ACTIVE | Noted: 2025-07-30

## 2025-07-30 LAB
ABO GROUP (TYPE) IN BLOOD: NORMAL
ABO GROUP (TYPE) IN BLOOD: NORMAL
ALBUMIN SERPL BCP-MCNC: 3.2 G/DL (ref 3.4–5)
ALP SERPL-CCNC: 189 U/L (ref 33–110)
ALT SERPL W P-5'-P-CCNC: 8 U/L (ref 7–45)
ANION GAP SERPL CALCULATED.3IONS-SCNC: 15 MMOL/L (ref 10–20)
ANTIBODY SCREEN: NORMAL
AST SERPL W P-5'-P-CCNC: 18 U/L (ref 9–39)
BILIRUB SERPL-MCNC: 0.4 MG/DL (ref 0–1.2)
BUN SERPL-MCNC: 7 MG/DL (ref 6–23)
CALCIUM SERPL-MCNC: 8.9 MG/DL (ref 8.6–10.3)
CHLORIDE SERPL-SCNC: 107 MMOL/L (ref 98–107)
CO2 SERPL-SCNC: 20 MMOL/L (ref 21–32)
CREAT SERPL-MCNC: 0.67 MG/DL (ref 0.5–1.05)
CREAT UR-MCNC: 117.2 MG/DL (ref 20–320)
EGFRCR SERPLBLD CKD-EPI 2021: >90 ML/MIN/1.73M*2
ERYTHROCYTE [DISTWIDTH] IN BLOOD BY AUTOMATED COUNT: 13.2 % (ref 11.5–14.5)
GLUCOSE SERPL-MCNC: 93 MG/DL (ref 74–99)
HCT VFR BLD AUTO: 37.9 % (ref 36–46)
HGB BLD-MCNC: 12.9 G/DL (ref 12–16)
MCH RBC QN AUTO: 31 PG (ref 26–34)
MCHC RBC AUTO-ENTMCNC: 34 G/DL (ref 32–36)
MCV RBC AUTO: 91 FL (ref 80–100)
NRBC BLD-RTO: 0 /100 WBCS (ref 0–0)
PLATELET # BLD AUTO: 233 X10*3/UL (ref 150–450)
POTASSIUM SERPL-SCNC: 3.5 MMOL/L (ref 3.5–5.3)
PROT SERPL-MCNC: 6 G/DL (ref 6.4–8.2)
PROT UR-MCNC: 39 MG/DL (ref 5–24)
PROT/CREAT UR: 0.33 MG/MG CREAT (ref 0–0.17)
RBC # BLD AUTO: 4.16 X10*6/UL (ref 4–5.2)
RH FACTOR (ANTIGEN D): NORMAL
RH FACTOR (ANTIGEN D): NORMAL
SODIUM SERPL-SCNC: 138 MMOL/L (ref 136–145)
TREPONEMA PALLIDUM IGG+IGM AB [PRESENCE] IN SERUM OR PLASMA BY IMMUNOASSAY: NONREACTIVE
URATE SERPL-MCNC: 6.1 MG/DL (ref 2.3–6.7)
WBC # BLD AUTO: 11.7 X10*3/UL (ref 4.4–11.3)

## 2025-07-30 PROCEDURE — 85027 COMPLETE CBC AUTOMATED: CPT | Performed by: OBSTETRICS & GYNECOLOGY

## 2025-07-30 PROCEDURE — 82570 ASSAY OF URINE CREATININE: CPT | Performed by: OBSTETRICS & GYNECOLOGY

## 2025-07-30 PROCEDURE — 36415 COLL VENOUS BLD VENIPUNCTURE: CPT | Performed by: OBSTETRICS & GYNECOLOGY

## 2025-07-30 PROCEDURE — 2500000004 HC RX 250 GENERAL PHARMACY W/ HCPCS (ALT 636 FOR OP/ED): Performed by: OBSTETRICS & GYNECOLOGY

## 2025-07-30 PROCEDURE — 51701 INSERT BLADDER CATHETER: CPT

## 2025-07-30 PROCEDURE — 2500000004 HC RX 250 GENERAL PHARMACY W/ HCPCS (ALT 636 FOR OP/ED): Performed by: NURSE ANESTHETIST, CERTIFIED REGISTERED

## 2025-07-30 PROCEDURE — 1220000001 HC OB SEMI-PRIVATE ROOM DAILY

## 2025-07-30 PROCEDURE — 7210000002 HC LABOR PER HOUR

## 2025-07-30 PROCEDURE — 86901 BLOOD TYPING SEROLOGIC RH(D): CPT | Performed by: OBSTETRICS & GYNECOLOGY

## 2025-07-30 PROCEDURE — 3700000014 EPIDURAL BLOCK: Performed by: NURSE ANESTHETIST, CERTIFIED REGISTERED

## 2025-07-30 PROCEDURE — 84550 ASSAY OF BLOOD/URIC ACID: CPT | Performed by: OBSTETRICS & GYNECOLOGY

## 2025-07-30 PROCEDURE — 86780 TREPONEMA PALLIDUM: CPT | Mod: TRILAB | Performed by: OBSTETRICS & GYNECOLOGY

## 2025-07-30 PROCEDURE — 80053 COMPREHEN METABOLIC PANEL: CPT | Performed by: OBSTETRICS & GYNECOLOGY

## 2025-07-30 PROCEDURE — 2500000001 HC RX 250 WO HCPCS SELF ADMINISTERED DRUGS (ALT 637 FOR MEDICARE OP): Performed by: OBSTETRICS & GYNECOLOGY

## 2025-07-30 PROCEDURE — 59050 FETAL MONITOR W/REPORT: CPT

## 2025-07-30 RX ORDER — HYDRALAZINE HYDROCHLORIDE 20 MG/ML
5 INJECTION INTRAMUSCULAR; INTRAVENOUS ONCE AS NEEDED
Status: DISCONTINUED | OUTPATIENT
Start: 2025-07-30 | End: 2025-07-31

## 2025-07-30 RX ORDER — METHYLERGONOVINE MALEATE 0.2 MG/ML
0.2 INJECTION INTRAVENOUS ONCE AS NEEDED
Status: DISCONTINUED | OUTPATIENT
Start: 2025-07-30 | End: 2025-07-31

## 2025-07-30 RX ORDER — FENTANYL/ROPIVACAINE/NS/PF 2MCG/ML-.2
0-25 PLASTIC BAG, INJECTION (ML) INJECTION CONTINUOUS
Status: DISCONTINUED | OUTPATIENT
Start: 2025-07-30 | End: 2025-07-31

## 2025-07-30 RX ORDER — MISOPROSTOL 200 UG/1
800 TABLET ORAL ONCE AS NEEDED
Status: DISCONTINUED | OUTPATIENT
Start: 2025-07-30 | End: 2025-07-31

## 2025-07-30 RX ORDER — ONDANSETRON HYDROCHLORIDE 2 MG/ML
4 INJECTION, SOLUTION INTRAVENOUS EVERY 6 HOURS PRN
Status: DISCONTINUED | OUTPATIENT
Start: 2025-07-30 | End: 2025-07-31

## 2025-07-30 RX ORDER — OXYTOCIN 10 [USP'U]/ML
10 INJECTION, SOLUTION INTRAMUSCULAR; INTRAVENOUS ONCE AS NEEDED
Status: DISCONTINUED | OUTPATIENT
Start: 2025-07-30 | End: 2025-07-31

## 2025-07-30 RX ORDER — OXYTOCIN/0.9 % SODIUM CHLORIDE 30/500 ML
60 PLASTIC BAG, INJECTION (ML) INTRAVENOUS ONCE AS NEEDED
Status: DISCONTINUED | OUTPATIENT
Start: 2025-07-30 | End: 2025-07-31

## 2025-07-30 RX ORDER — TERBUTALINE SULFATE 1 MG/ML
0.25 INJECTION SUBCUTANEOUS ONCE AS NEEDED
Status: DISCONTINUED | OUTPATIENT
Start: 2025-07-30 | End: 2025-07-31

## 2025-07-30 RX ORDER — CEFAZOLIN SODIUM 1 G/50ML
1 SOLUTION INTRAVENOUS EVERY 8 HOURS
Status: DISCONTINUED | OUTPATIENT
Start: 2025-07-30 | End: 2025-07-31

## 2025-07-30 RX ORDER — SODIUM CHLORIDE, SODIUM LACTATE, POTASSIUM CHLORIDE, CALCIUM CHLORIDE 600; 310; 30; 20 MG/100ML; MG/100ML; MG/100ML; MG/100ML
75 INJECTION, SOLUTION INTRAVENOUS CONTINUOUS
Status: DISCONTINUED | OUTPATIENT
Start: 2025-07-30 | End: 2025-07-31

## 2025-07-30 RX ORDER — TRANEXAMIC ACID 10 MG/ML
1000 INJECTION, SOLUTION INTRAVENOUS ONCE AS NEEDED
Status: DISCONTINUED | OUTPATIENT
Start: 2025-07-30 | End: 2025-07-31

## 2025-07-30 RX ORDER — CALCIUM CARBONATE 200(500)MG
1 TABLET,CHEWABLE ORAL EVERY 6 HOURS PRN
Status: DISCONTINUED | OUTPATIENT
Start: 2025-07-30 | End: 2025-07-31

## 2025-07-30 RX ORDER — CEFAZOLIN SODIUM 2 G/100ML
2 INJECTION, SOLUTION INTRAVENOUS ONCE
Status: COMPLETED | OUTPATIENT
Start: 2025-07-30 | End: 2025-07-30

## 2025-07-30 RX ORDER — DULOXETIN HYDROCHLORIDE 30 MG/1
30 CAPSULE, DELAYED RELEASE ORAL DAILY
Status: DISCONTINUED | OUTPATIENT
Start: 2025-07-30 | End: 2025-07-31

## 2025-07-30 RX ORDER — LOPERAMIDE HYDROCHLORIDE 2 MG/1
4 CAPSULE ORAL EVERY 2 HOUR PRN
Status: DISCONTINUED | OUTPATIENT
Start: 2025-07-30 | End: 2025-07-31

## 2025-07-30 RX ORDER — OXYTOCIN/0.9 % SODIUM CHLORIDE 30/500 ML
2-30 PLASTIC BAG, INJECTION (ML) INTRAVENOUS CONTINUOUS
Status: DISCONTINUED | OUTPATIENT
Start: 2025-07-30 | End: 2025-07-31

## 2025-07-30 RX ORDER — FAMOTIDINE 10 MG/ML
20 INJECTION, SOLUTION INTRAVENOUS 2 TIMES DAILY
Status: DISCONTINUED | OUTPATIENT
Start: 2025-07-30 | End: 2025-07-31

## 2025-07-30 RX ORDER — CARBOPROST TROMETHAMINE 250 UG/ML
250 INJECTION, SOLUTION INTRAMUSCULAR ONCE AS NEEDED
Status: DISCONTINUED | OUTPATIENT
Start: 2025-07-30 | End: 2025-07-31

## 2025-07-30 RX ORDER — LABETALOL HYDROCHLORIDE 5 MG/ML
20 INJECTION, SOLUTION INTRAVENOUS ONCE AS NEEDED
Status: DISCONTINUED | OUTPATIENT
Start: 2025-07-30 | End: 2025-07-31

## 2025-07-30 RX ORDER — LIDOCAINE HYDROCHLORIDE 10 MG/ML
20 INJECTION, SOLUTION INFILTRATION; PERINEURAL ONCE AS NEEDED
Status: DISCONTINUED | OUTPATIENT
Start: 2025-07-30 | End: 2025-07-31

## 2025-07-30 RX ORDER — ONDANSETRON 4 MG/1
4 TABLET, FILM COATED ORAL EVERY 6 HOURS PRN
Status: DISCONTINUED | OUTPATIENT
Start: 2025-07-30 | End: 2025-07-31

## 2025-07-30 RX ORDER — DEXMEDETOMIDINE IN 0.9 % NACL 20 MCG/5ML
SYRINGE (ML) INTRAVENOUS AS NEEDED
Status: DISCONTINUED | OUTPATIENT
Start: 2025-07-30 | End: 2025-07-31

## 2025-07-30 RX ADMIN — Medication 20 MCG: at 23:00

## 2025-07-30 RX ADMIN — Medication 10 ML/HR: at 16:50

## 2025-07-30 RX ADMIN — SODIUM CHLORIDE, SODIUM LACTATE, POTASSIUM CHLORIDE, AND CALCIUM CHLORIDE 75 ML/HR: 600; 310; 30; 20 INJECTION, SOLUTION INTRAVENOUS at 09:48

## 2025-07-30 RX ADMIN — CEFAZOLIN SODIUM 2 G: 2 INJECTION, SOLUTION INTRAVENOUS at 09:56

## 2025-07-30 RX ADMIN — ONDANSETRON 4 MG: 2 INJECTION INTRAMUSCULAR; INTRAVENOUS at 19:40

## 2025-07-30 RX ADMIN — Medication 5 ML: at 16:49

## 2025-07-30 RX ADMIN — Medication 2 MILLI-UNITS/MIN: at 10:04

## 2025-07-30 RX ADMIN — DULOXETINE HYDROCHLORIDE 30 MG: 30 CAPSULE, DELAYED RELEASE ORAL at 22:12

## 2025-07-30 RX ADMIN — CEFAZOLIN SODIUM 1 G: 1 INJECTION, SOLUTION INTRAVENOUS at 18:52

## 2025-07-30 RX ADMIN — Medication 5 ML: at 16:31

## 2025-07-30 SDOH — SOCIAL STABILITY: SOCIAL INSECURITY: PHYSICAL ABUSE: DENIES

## 2025-07-30 SDOH — HEALTH STABILITY: MENTAL HEALTH: WERE YOU ABLE TO COMPLETE ALL THE BEHAVIORAL HEALTH SCREENINGS?: YES

## 2025-07-30 SDOH — SOCIAL STABILITY: SOCIAL INSECURITY
WITHIN THE LAST YEAR, HAVE YOU BEEN KICKED, HIT, SLAPPED, OR OTHERWISE PHYSICALLY HURT BY YOUR PARTNER OR EX-PARTNER?: NO

## 2025-07-30 SDOH — SOCIAL STABILITY: SOCIAL INSECURITY: ARE YOU OR HAVE YOU BEEN THREATENED OR ABUSED PHYSICALLY, EMOTIONALLY, OR SEXUALLY BY ANYONE?: YES

## 2025-07-30 SDOH — HEALTH STABILITY: MENTAL HEALTH: NON-SPECIFIC ACTIVE SUICIDAL THOUGHTS (PAST 1 MONTH): NO

## 2025-07-30 SDOH — ECONOMIC STABILITY: HOUSING INSECURITY: DO YOU FEEL UNSAFE GOING BACK TO THE PLACE WHERE YOU ARE LIVING?: NO

## 2025-07-30 SDOH — HEALTH STABILITY: MENTAL HEALTH: WISH TO BE DEAD (PAST 1 MONTH): NO

## 2025-07-30 SDOH — HEALTH STABILITY: MENTAL HEALTH: SUICIDAL BEHAVIOR (LIFETIME): YES

## 2025-07-30 SDOH — SOCIAL STABILITY: SOCIAL INSECURITY: WITHIN THE LAST YEAR, HAVE YOU BEEN HUMILIATED OR EMOTIONALLY ABUSED IN OTHER WAYS BY YOUR PARTNER OR EX-PARTNER?: NO

## 2025-07-30 SDOH — SOCIAL STABILITY: SOCIAL INSECURITY: WITHIN THE LAST YEAR, HAVE YOU BEEN AFRAID OF YOUR PARTNER OR EX-PARTNER?: NO

## 2025-07-30 SDOH — ECONOMIC STABILITY: FOOD INSECURITY: WITHIN THE PAST 12 MONTHS, YOU WORRIED THAT YOUR FOOD WOULD RUN OUT BEFORE YOU GOT THE MONEY TO BUY MORE.: NEVER TRUE

## 2025-07-30 SDOH — ECONOMIC STABILITY: FOOD INSECURITY: WITHIN THE PAST 12 MONTHS, THE FOOD YOU BOUGHT JUST DIDN'T LAST AND YOU DIDN'T HAVE MONEY TO GET MORE.: NEVER TRUE

## 2025-07-30 SDOH — SOCIAL STABILITY: SOCIAL INSECURITY: HAVE YOU HAD THOUGHTS OF HARMING ANYONE ELSE?: NO

## 2025-07-30 SDOH — HEALTH STABILITY: MENTAL HEALTH: CURRENT SMOKER: 0

## 2025-07-30 SDOH — SOCIAL STABILITY: SOCIAL INSECURITY: DOES ANYONE TRY TO KEEP YOU FROM HAVING/CONTACTING OTHER FRIENDS OR DOING THINGS OUTSIDE YOUR HOME?: NO

## 2025-07-30 SDOH — SOCIAL STABILITY: SOCIAL INSECURITY: HAVE YOU HAD ANY THOUGHTS OF HARMING ANYONE ELSE?: NO

## 2025-07-30 SDOH — SOCIAL STABILITY: SOCIAL INSECURITY: ABUSE SCREEN: ADULT

## 2025-07-30 SDOH — SOCIAL STABILITY: SOCIAL INSECURITY: HAS ANYONE EVER THREATENED TO HURT YOUR FAMILY OR YOUR PETS?: NO

## 2025-07-30 SDOH — SOCIAL STABILITY: SOCIAL INSECURITY
WITHIN THE LAST YEAR, HAVE YOU BEEN RAPED OR FORCED TO HAVE ANY KIND OF SEXUAL ACTIVITY BY YOUR PARTNER OR EX-PARTNER?: NO

## 2025-07-30 SDOH — SOCIAL STABILITY: SOCIAL INSECURITY: DO YOU FEEL ANYONE HAS EXPLOITED OR TAKEN ADVANTAGE OF YOU FINANCIALLY OR OF YOUR PERSONAL PROPERTY?: NO

## 2025-07-30 SDOH — HEALTH STABILITY: MENTAL HEALTH: SUICIDAL BEHAVIOR (3 MONTHS): NO

## 2025-07-30 SDOH — SOCIAL STABILITY: SOCIAL INSECURITY: VERBAL ABUSE: DENIES

## 2025-07-30 SDOH — SOCIAL STABILITY: SOCIAL INSECURITY: ARE THERE ANY APPARENT SIGNS OF INJURIES/BEHAVIORS THAT COULD BE RELATED TO ABUSE/NEGLECT?: NO

## 2025-07-30 ASSESSMENT — ACTIVITIES OF DAILY LIVING (ADL): LACK_OF_TRANSPORTATION: NO

## 2025-07-30 ASSESSMENT — PAIN SCALES - GENERAL
PAINLEVEL_OUTOF10: 3
PAINLEVEL_OUTOF10: 3
PAINLEVEL_OUTOF10: 8
PAINLEVEL_OUTOF10: 4
PAINLEVEL_OUTOF10: 0 - NO PAIN

## 2025-07-30 ASSESSMENT — LIFESTYLE VARIABLES
HOW OFTEN DO YOU HAVE 6 OR MORE DRINKS ON ONE OCCASION: NEVER
HOW OFTEN DO YOU HAVE A DRINK CONTAINING ALCOHOL: NEVER
AUDIT-C TOTAL SCORE: 0
HOW MANY STANDARD DRINKS CONTAINING ALCOHOL DO YOU HAVE ON A TYPICAL DAY: PATIENT DOES NOT DRINK
SKIP TO QUESTIONS 9-10: 1
AUDIT-C TOTAL SCORE: 0

## 2025-07-30 NOTE — ANESTHESIA PREPROCEDURE EVALUATION
Patient: Mayra Coronado    Evaluation Method: In-person visit    Procedure Information    Date: 07/30/25  Procedure: Labor Analgesia         Relevant Problems   GYN   (+) 38 weeks gestation of pregnancy ([facility])       Clinical information reviewed:    Allergies  Meds  Problems              NPO Detail:  No data recorded     OB/GYN     Physical Exam    Airway  Mallampati: II  TM distance: <3 FB  Neck ROM: full  Mouth opening: 3 or more finger widths     Cardiovascular - normal exam   Dental - normal exam     Pulmonary - normal exam   Abdominal            Anesthesia Plan    History of general anesthesia?: no  History of complications of general anesthesia?: unknown/emergency    ASA 2     epidural     The patient is not a current smoker.  Patient was not previously instructed to abstain from smoking on day of procedure.  Patient did not smoke on day of procedure.    Anesthetic plan and risks discussed with patient and spouse.  Use of blood products discussed with patient and spouse who.

## 2025-07-30 NOTE — ANESTHESIA PROCEDURE NOTES
Epidural Block    Patient location during procedure: OB  Start time: 7/30/2025 4:26 PM  End time: 7/30/2025 4:27 PM  Reason for block: labor analgesia  Staffing  Performed: CRNA   Authorized by: FERNANDO Epps    Performed by: FERNANDO Epps    Preanesthetic Checklist  Completed: patient identified, IV checked, risks and benefits discussed, surgical consent, pre-op evaluation, timeout performed and sterile techniques followed  Block Timeout  RN/Licensed healthcare professional reads aloud to the Anesthesia provider and entire team: Patient identity, procedure with side and site, patient position, and as applicable the availability of implants/special equipment/special requirements.  Patient on coagulant treatment: no  Timeout performed at: 7/30/2025 4:25 PM  Block Placement  Patient position: sitting  Prep: ChloraPrep  Sterility prep: cap, gloves and mask  Sedation level: no sedation  Patient monitoring: blood pressure, continuous pulse oximetry and heart rate  Approach: midline  Local numbing: lidocaine 1% to skin and subcutaneous tissues  Vertebral space: lumbar  Lumbar location: L4-L5  Epidural  Loss of resistance technique: air  Guidance: landmark technique        Needle  Needle type: Tuohy   Needle gauge: 17  Needle length: 8.9cm  Needle insertion depth: 6 cm  Catheter type: multi-orifice  Catheter size: 19 G  Catheter at skin depth: 15 cm  Catheter securement method: clear occlusive dressing    Test dose: lidocaine 1.5% with epinephrine 1-to-200,000  Test dose: lidocaine 1.5% with epinephrine 1-to-200,000  Test dose result: no positive test dose    PCEA  Medication concentration used: 0.2% Ropivacaine with 2 mcg/mL Fentanyl  Dose (mL): 1  Lockout (minutes): 20  1-Hour Limit (boluses/hr): 3  Basal Rate: 10        Assessment  Sensory level: T10 bilateral  Block outcome: patient comfortable  Number of attempts: 1  Events: no positive test dose  Procedure assessment: patient tolerated  procedure well with no immediate complications

## 2025-07-30 NOTE — H&P
OB Admission H&P    Assessment/Plan    Mayra Coronado is a 25 y.o.  at 38w1d, MUSA: 2025, by Last Menstrual Period, who is admitted for ruptured membranes.    Plan   -Admit to L&D, consented  -T&S, CBC, and Syphilis  -Epidural at patient request  -Recheck as clinically indicated by maternal or fetal status    Fetal Status  -NST reactive, reassuring   -Presentation  based on ultrasound  -EFW 8+ by US  -GBS positive    Pregnancy Problems (from 25 to present)       Problem Noted Diagnosed Resolved    38 weeks gestation of pregnancy ([facility]) 2025 by Shannon Samaniego MD  No    Priority:  Medium               Subjective   Good fetal movement.  Denies vaginal bleeding., Denies contractions. Leaking fluid since 445    Prenatal Provider Delio    OB History    Para Term  AB Living   1 0 0 0 0 0   SAB IAB Ectopic Multiple Live Births   0 0 0 0 0      # Outcome Date GA Lbr Robert/2nd Weight Sex Type Anes PTL Lv   1 Current                Surgical History[1]    Social History     Tobacco Use    Smoking status: Never    Smokeless tobacco: Never   Substance Use Topics    Alcohol use: Not Currently     Comment: pregnant       Allergies[2]    Prescriptions Prior to Admission[3]  Objective     Last Vitals  Temp Pulse Resp BP MAP O2 Sat   (!) 35.3 °C (95.5 °F) (!) 123 22 (!) 141/87 109 97 %     Blood Pressures         2025  0809 2025  0833          BP: 144/93 141/87               Physical Exam  General: NAD, mood appropriate  Cardiopulmonary: warm and well perfused, breathing comfortably on room air  Abdomen: Gravid, non-tender  Extremities: Symmetric  Speculum Exam:   Cervix: 1 /60 /-1     Chaperone Present: Yes.  Chaperone Name/Title: RN  Examination Chaperoned: Entire Physical Exam     Fetal Monitoring  Baseline:  bpm, Variability: moderate,  Accelerations: present and Decelerations: none  Uterine Activity: No contractions seen on toco  Interpretation: Category 1    Labs in chart were  reviewed.   Results from last 7 days   Lab Units 07/27/25 1953   WBC AUTO x10*3/uL 9.8   HEMOGLOBIN g/dL 12.8   HEMATOCRIT % 37.4   PLATELETS AUTO x10*3/uL 248   AST U/L 16   ALT U/L 8   CREATININE mg/dL 0.60        Prenatal labs reviewed, not remarkable.             [1] No past surgical history on file.  [2]   Allergies  Allergen Reactions    Adhesive Tape-Silicones Rash    Amoxicillin Itching and Rash   [3]   Medications Prior to Admission   Medication Sig Dispense Refill Last Dose/Taking    DULoxetine (Cymbalta) 60 mg DR capsule Take 30 mg by mouth once daily. Per pt, dose changed during early pregnancy   7/29/2025 Bedtime    famotidine (Pepcid) 20 mg tablet Take by mouth.   7/29/2025 Bedtime    prenatal no115/iron/folic acid (PRENATAL 19 ORAL) Take by mouth.   7/29/2025 Bedtime    clobetasol (Temovate) 0.05 % ointment Apply topically 2 times a day. Do not apply to face or neck. 30 g 0     diphenhydrAMINE (BENADryl) 25 mg capsule Take by mouth. (Patient not taking: Reported on 7/7/2025)       diphenhydrAMINE (BENADryl) 25 mg capsule Take by mouth.       triamcinolone (Kenalog) 0.1 % cream Apply topically 2 times a day. 30 g 0

## 2025-07-30 NOTE — PROGRESS NOTES
Intrapartum Progress Note    Assessment/Plan   Mayra Coronado is a 25 y.o.  at 38w1d. MUSA: 2025, by Last Menstrual Period.   Preeclampsia w/o severe features    Subjective   No HA    Objective   Last Vitals:  Temp Pulse Resp BP MAP Pulse Ox   36.9 °C (98.4 °F) (!) 120 14 136/87 107 100 %     Vitals Min/Max Last 24 Hours:  Temp  Min: 36.2 °C (97.2 °F)  Max: 36.9 °C (98.4 °F)  Pulse  Min: 90  Max: 146  Resp  Min: 14  Max: 22  BP  Min: 125/83  Max: 152/96  MAP (mmHg)  Min: 98  Max: 117    Intake/Output:    Intake/Output Summary (Last 24 hours) at 2025  Last data filed at 2025 1841  Gross per 24 hour   Intake --   Output 200 ml   Net -200 ml       Physical Examination:  GENERAL: Examination reveals a obese, gravid female. emesis after examalert and cooperative. Maternal tachycardia all day.  Labile BP.  FHR is 115 , with Accelerations, and a  1 tracing.    [place] reading:  Q2-3  CERVIX: 4-5 cm dilated, 90 % effaced, -2 station; MEMBRANES are SROM    Chaperone Present: Yes.  Chaperone Name/Title: Family and nurse  Examination Chaperoned: Entire Physical Exam    Lab Review:  Lab Results   Component Value Date    ABO O 2025    LABRH POS 2025    ABSCRN NEG 2025     Lab Results   Component Value Date    WBC 11.7 (H) 2025    HGB 12.9 2025    HCT 37.9 2025     2025     0   Lab Value Date/Time    GRPBSTREP SEE NOTE (A) 2025 1101     Lab Results   Component Value Date    GLUCOSE 93 2025     2025    K 3.5 2025     2025    CO2 20 (L) 2025    ANIONGAP 15 2025    BUN 7 2025    CREATININE 0.67 2025    EGFR >90 2025    CALCIUM 8.9 2025    ALBUMIN 3.2 (L) 2025    PROT 6.0 (L) 2025    ALKPHOS 189 (H) 2025    ALT 8 2025    AST 18 2025    BILITOT 0.4 2025     0   Lab Value Date/Time    UTPCR 0.33 (H) 2025 1847    UTPCR 0.28 (H) 2025

## 2025-07-30 NOTE — CARE PLAN
The clinical goals for the shift include safe delivery    Problem: Vaginal Birth or  Section  Goal: Fetal and maternal status remain reassuring during the birth process  Outcome: Progressing  Goal: Demonstrates labor coping techniques through delivery  Outcome: Progressing  Goal: Minimal s/sx of HDP and BP<160/110  Outcome: Progressing  Goal: No s/sx of infection through recovery  Outcome: Progressing  Goal: No s/sx of hemorrhage through recovery  Outcome: Progressing     Problem: Pain - Adult  Goal: Verbalizes/displays adequate comfort level or baseline comfort level  Outcome: Progressing     Problem: Safety - Adult  Goal: Free from fall injury  Outcome: Progressing     Problem: Discharge Planning  Goal: Discharge to home or other facility with appropriate resources  Outcome: Progressing

## 2025-07-31 ENCOUNTER — CLINICAL DOCUMENTATION ONLY (OUTPATIENT)
Dept: OTHER | Facility: HOSPITAL | Age: 25
End: 2025-07-31

## 2025-07-31 ENCOUNTER — HOSPITAL ENCOUNTER (INPATIENT)
Facility: HOSPITAL | Age: 25
End: 2025-07-31
Attending: STUDENT IN AN ORGANIZED HEALTH CARE EDUCATION/TRAINING PROGRAM | Admitting: STUDENT IN AN ORGANIZED HEALTH CARE EDUCATION/TRAINING PROGRAM
Payer: MEDICAID

## 2025-07-31 LAB
ALBUMIN SERPL BCP-MCNC: 2.8 G/DL (ref 3.4–5)
ALP SERPL-CCNC: 152 U/L (ref 33–110)
ALT SERPL W P-5'-P-CCNC: 7 U/L (ref 7–45)
ANION GAP SERPL CALCULATED.3IONS-SCNC: 14 MMOL/L (ref 10–20)
AST SERPL W P-5'-P-CCNC: 23 U/L (ref 9–39)
BILIRUB SERPL-MCNC: 0.5 MG/DL (ref 0–1.2)
BUN SERPL-MCNC: 12 MG/DL (ref 6–23)
CALCIUM SERPL-MCNC: 7.9 MG/DL (ref 8.6–10.3)
CHLORIDE SERPL-SCNC: 107 MMOL/L (ref 98–107)
CO2 SERPL-SCNC: 20 MMOL/L (ref 21–32)
CREAT SERPL-MCNC: 0.97 MG/DL (ref 0.5–1.05)
EGFRCR SERPLBLD CKD-EPI 2021: 83 ML/MIN/1.73M*2
ERYTHROCYTE [DISTWIDTH] IN BLOOD BY AUTOMATED COUNT: 13.9 % (ref 11.5–14.5)
GLUCOSE SERPL-MCNC: 134 MG/DL (ref 74–99)
HCT VFR BLD AUTO: 32.3 % (ref 36–46)
HGB BLD-MCNC: 10.8 G/DL (ref 12–16)
MCH RBC QN AUTO: 31.1 PG (ref 26–34)
MCHC RBC AUTO-ENTMCNC: 33.4 G/DL (ref 32–36)
MCV RBC AUTO: 93 FL (ref 80–100)
NRBC BLD-RTO: 0 /100 WBCS (ref 0–0)
PLATELET # BLD AUTO: 247 X10*3/UL (ref 150–450)
POTASSIUM SERPL-SCNC: 3.9 MMOL/L (ref 3.5–5.3)
PROT SERPL-MCNC: 5 G/DL (ref 6.4–8.2)
RBC # BLD AUTO: 3.47 X10*6/UL (ref 4–5.2)
SODIUM SERPL-SCNC: 137 MMOL/L (ref 136–145)
WBC # BLD AUTO: 25.4 X10*3/UL (ref 4.4–11.3)

## 2025-07-31 PROCEDURE — 3700000014 HC AN EPIDURAL BLOCK CHARGE: Performed by: STUDENT IN AN ORGANIZED HEALTH CARE EDUCATION/TRAINING PROGRAM

## 2025-07-31 PROCEDURE — 2500000001 HC RX 250 WO HCPCS SELF ADMINISTERED DRUGS (ALT 637 FOR MEDICARE OP): Performed by: NURSE ANESTHETIST, CERTIFIED REGISTERED

## 2025-07-31 PROCEDURE — 2500000004 HC RX 250 GENERAL PHARMACY W/ HCPCS (ALT 636 FOR OP/ED): Performed by: NURSE ANESTHETIST, CERTIFIED REGISTERED

## 2025-07-31 PROCEDURE — 2500000004 HC RX 250 GENERAL PHARMACY W/ HCPCS (ALT 636 FOR OP/ED): Performed by: OBSTETRICS & GYNECOLOGY

## 2025-07-31 PROCEDURE — 59514 CESAREAN DELIVERY ONLY: CPT | Performed by: STUDENT IN AN ORGANIZED HEALTH CARE EDUCATION/TRAINING PROGRAM

## 2025-07-31 PROCEDURE — 2500000001 HC RX 250 WO HCPCS SELF ADMINISTERED DRUGS (ALT 637 FOR MEDICARE OP): Performed by: STUDENT IN AN ORGANIZED HEALTH CARE EDUCATION/TRAINING PROGRAM

## 2025-07-31 PROCEDURE — 1210000001 HC SEMI-PRIVATE ROOM DAILY

## 2025-07-31 PROCEDURE — A4649 SURGICAL SUPPLIES: HCPCS | Performed by: STUDENT IN AN ORGANIZED HEALTH CARE EDUCATION/TRAINING PROGRAM

## 2025-07-31 PROCEDURE — 2720000007 HC OR 272 NO HCPCS: Performed by: STUDENT IN AN ORGANIZED HEALTH CARE EDUCATION/TRAINING PROGRAM

## 2025-07-31 PROCEDURE — 51701 INSERT BLADDER CATHETER: CPT

## 2025-07-31 PROCEDURE — 2500000004 HC RX 250 GENERAL PHARMACY W/ HCPCS (ALT 636 FOR OP/ED): Mod: JZ | Performed by: STUDENT IN AN ORGANIZED HEALTH CARE EDUCATION/TRAINING PROGRAM

## 2025-07-31 PROCEDURE — 2500000005 HC RX 250 GENERAL PHARMACY W/O HCPCS: Performed by: NURSE ANESTHETIST, CERTIFIED REGISTERED

## 2025-07-31 PROCEDURE — 85027 COMPLETE CBC AUTOMATED: CPT | Performed by: STUDENT IN AN ORGANIZED HEALTH CARE EDUCATION/TRAINING PROGRAM

## 2025-07-31 PROCEDURE — 7100000016 HC LABOR RECOVERY PER HOUR

## 2025-07-31 PROCEDURE — 7100000016 HC LABOR RECOVERY PER HOUR: Performed by: STUDENT IN AN ORGANIZED HEALTH CARE EDUCATION/TRAINING PROGRAM

## 2025-07-31 PROCEDURE — 80053 COMPREHEN METABOLIC PANEL: CPT | Performed by: STUDENT IN AN ORGANIZED HEALTH CARE EDUCATION/TRAINING PROGRAM

## 2025-07-31 PROCEDURE — 51702 INSERT TEMP BLADDER CATH: CPT

## 2025-07-31 PROCEDURE — 88307 TISSUE EXAM BY PATHOLOGIST: CPT | Mod: TC,TRILAB | Performed by: STUDENT IN AN ORGANIZED HEALTH CARE EDUCATION/TRAINING PROGRAM

## 2025-07-31 PROCEDURE — 7210000002 HC LABOR PER HOUR

## 2025-07-31 RX ORDER — DULOXETIN HYDROCHLORIDE 30 MG/1
30 CAPSULE, DELAYED RELEASE ORAL EVERY 24 HOURS
Status: DISCONTINUED | OUTPATIENT
Start: 2025-07-31 | End: 2025-08-03 | Stop reason: HOSPADM

## 2025-07-31 RX ORDER — ACETAMINOPHEN 325 MG/1
975 TABLET ORAL EVERY 6 HOURS
Status: DISCONTINUED | OUTPATIENT
Start: 2025-08-01 | End: 2025-08-03 | Stop reason: HOSPADM

## 2025-07-31 RX ORDER — CARBOPROST TROMETHAMINE 250 UG/ML
250 INJECTION, SOLUTION INTRAMUSCULAR ONCE AS NEEDED
Status: DISCONTINUED | OUTPATIENT
Start: 2025-07-31 | End: 2025-07-31

## 2025-07-31 RX ORDER — LABETALOL HYDROCHLORIDE 5 MG/ML
20 INJECTION, SOLUTION INTRAVENOUS ONCE AS NEEDED
Status: DISCONTINUED | OUTPATIENT
Start: 2025-07-31 | End: 2025-07-31

## 2025-07-31 RX ORDER — MORPHINE SULFATE 0.5 MG/ML
INJECTION, SOLUTION EPIDURAL; INTRATHECAL; INTRAVENOUS AS NEEDED
Status: DISCONTINUED | OUTPATIENT
Start: 2025-07-31 | End: 2025-07-31

## 2025-07-31 RX ORDER — SODIUM CITRATE AND CITRIC ACID MONOHYDRATE 334; 500 MG/5ML; MG/5ML
30 SOLUTION ORAL ONCE
Status: COMPLETED | OUTPATIENT
Start: 2025-07-31 | End: 2025-07-31

## 2025-07-31 RX ORDER — OXYCODONE HYDROCHLORIDE 5 MG/1
5 TABLET ORAL EVERY 4 HOURS PRN
Status: DISCONTINUED | OUTPATIENT
Start: 2025-08-01 | End: 2025-07-31

## 2025-07-31 RX ORDER — AZITHROMYCIN MONOHYDRATE 500 MG/5ML
INJECTION, POWDER, LYOPHILIZED, FOR SOLUTION INTRAVENOUS AS NEEDED
Status: DISCONTINUED | OUTPATIENT
Start: 2025-07-31 | End: 2025-07-31

## 2025-07-31 RX ORDER — HYDRALAZINE HYDROCHLORIDE 20 MG/ML
5 INJECTION INTRAMUSCULAR; INTRAVENOUS ONCE AS NEEDED
Status: DISCONTINUED | OUTPATIENT
Start: 2025-07-31 | End: 2025-07-31

## 2025-07-31 RX ORDER — LOPERAMIDE HYDROCHLORIDE 2 MG/1
4 CAPSULE ORAL EVERY 2 HOUR PRN
Status: DISCONTINUED | OUTPATIENT
Start: 2025-07-31 | End: 2025-08-03 | Stop reason: HOSPADM

## 2025-07-31 RX ORDER — CEFAZOLIN 1 G/1
INJECTION, POWDER, FOR SOLUTION INTRAVENOUS AS NEEDED
Status: DISCONTINUED | OUTPATIENT
Start: 2025-07-31 | End: 2025-07-31

## 2025-07-31 RX ORDER — ACETAMINOPHEN 120 MG/1
SUPPOSITORY RECTAL AS NEEDED
Status: DISCONTINUED | OUTPATIENT
Start: 2025-07-31 | End: 2025-07-31

## 2025-07-31 RX ORDER — HYDROMORPHONE HYDROCHLORIDE 0.2 MG/ML
0.2 INJECTION INTRAMUSCULAR; INTRAVENOUS; SUBCUTANEOUS EVERY 5 MIN PRN
Status: DISCONTINUED | OUTPATIENT
Start: 2025-07-31 | End: 2025-08-03 | Stop reason: HOSPADM

## 2025-07-31 RX ORDER — SIMETHICONE 80 MG
80 TABLET,CHEWABLE ORAL 4 TIMES DAILY PRN
Status: DISCONTINUED | OUTPATIENT
Start: 2025-07-31 | End: 2025-08-03 | Stop reason: HOSPADM

## 2025-07-31 RX ORDER — CARBOPROST TROMETHAMINE 250 UG/ML
250 INJECTION, SOLUTION INTRAMUSCULAR ONCE AS NEEDED
Status: DISCONTINUED | OUTPATIENT
Start: 2025-07-31 | End: 2025-08-03 | Stop reason: HOSPADM

## 2025-07-31 RX ORDER — OXYTOCIN/0.9 % SODIUM CHLORIDE 30/500 ML
60 PLASTIC BAG, INJECTION (ML) INTRAVENOUS ONCE AS NEEDED
Status: DISCONTINUED | OUTPATIENT
Start: 2025-07-31 | End: 2025-07-31

## 2025-07-31 RX ORDER — IBUPROFEN 600 MG/1
600 TABLET, FILM COATED ORAL EVERY 6 HOURS
Status: DISCONTINUED | OUTPATIENT
Start: 2025-08-01 | End: 2025-08-03 | Stop reason: HOSPADM

## 2025-07-31 RX ORDER — NALOXONE HYDROCHLORIDE 0.4 MG/ML
0.1 INJECTION, SOLUTION INTRAMUSCULAR; INTRAVENOUS; SUBCUTANEOUS EVERY 5 MIN PRN
Status: DISCONTINUED | OUTPATIENT
Start: 2025-07-31 | End: 2025-07-31

## 2025-07-31 RX ORDER — LIDOCAINE 560 MG/1
1 PATCH PERCUTANEOUS; TOPICAL; TRANSDERMAL
Status: DISCONTINUED | OUTPATIENT
Start: 2025-07-31 | End: 2025-07-31

## 2025-07-31 RX ORDER — DIPHENHYDRAMINE HCL 25 MG
25 CAPSULE ORAL EVERY 4 HOURS PRN
Status: DISCONTINUED | OUTPATIENT
Start: 2025-07-31 | End: 2025-07-31

## 2025-07-31 RX ORDER — OXYTOCIN/0.9 % SODIUM CHLORIDE 30/500 ML
60 PLASTIC BAG, INJECTION (ML) INTRAVENOUS ONCE AS NEEDED
Status: DISCONTINUED | OUTPATIENT
Start: 2025-07-31 | End: 2025-08-03 | Stop reason: HOSPADM

## 2025-07-31 RX ORDER — OXYTOCIN 10 [USP'U]/ML
10 INJECTION, SOLUTION INTRAMUSCULAR; INTRAVENOUS ONCE AS NEEDED
Status: DISCONTINUED | OUTPATIENT
Start: 2025-07-31 | End: 2025-07-31

## 2025-07-31 RX ORDER — ONDANSETRON 4 MG/1
4 TABLET, FILM COATED ORAL EVERY 6 HOURS PRN
Status: DISCONTINUED | OUTPATIENT
Start: 2025-07-31 | End: 2025-08-03 | Stop reason: HOSPADM

## 2025-07-31 RX ORDER — KETOROLAC TROMETHAMINE 30 MG/ML
INJECTION, SOLUTION INTRAMUSCULAR; INTRAVENOUS AS NEEDED
Status: DISCONTINUED | OUTPATIENT
Start: 2025-07-31 | End: 2025-07-31

## 2025-07-31 RX ORDER — LABETALOL HYDROCHLORIDE 5 MG/ML
20 INJECTION, SOLUTION INTRAVENOUS ONCE AS NEEDED
Status: DISCONTINUED | OUTPATIENT
Start: 2025-07-31 | End: 2025-08-03 | Stop reason: HOSPADM

## 2025-07-31 RX ORDER — CEFAZOLIN SODIUM 2 G/100ML
2 INJECTION, SOLUTION INTRAVENOUS ONCE
Status: DISCONTINUED | OUTPATIENT
Start: 2025-07-31 | End: 2025-07-31

## 2025-07-31 RX ORDER — KETOROLAC TROMETHAMINE 30 MG/ML
30 INJECTION, SOLUTION INTRAMUSCULAR; INTRAVENOUS EVERY 6 HOURS
Status: COMPLETED | OUTPATIENT
Start: 2025-08-01 | End: 2025-08-01

## 2025-07-31 RX ORDER — LOPERAMIDE HYDROCHLORIDE 2 MG/1
4 CAPSULE ORAL EVERY 2 HOUR PRN
Status: DISCONTINUED | OUTPATIENT
Start: 2025-07-31 | End: 2025-07-31

## 2025-07-31 RX ORDER — DIPHENHYDRAMINE HYDROCHLORIDE 50 MG/ML
25 INJECTION, SOLUTION INTRAMUSCULAR; INTRAVENOUS EVERY 4 HOURS PRN
Status: DISCONTINUED | OUTPATIENT
Start: 2025-07-31 | End: 2025-07-31

## 2025-07-31 RX ORDER — POLYETHYLENE GLYCOL 3350 17 G/17G
17 POWDER, FOR SOLUTION ORAL 2 TIMES DAILY PRN
Status: DISCONTINUED | OUTPATIENT
Start: 2025-07-31 | End: 2025-08-03 | Stop reason: HOSPADM

## 2025-07-31 RX ORDER — OXYCODONE HYDROCHLORIDE 5 MG/1
5 TABLET ORAL EVERY 4 HOURS PRN
Refills: 0 | Status: DISCONTINUED | OUTPATIENT
Start: 2025-08-01 | End: 2025-08-03 | Stop reason: HOSPADM

## 2025-07-31 RX ORDER — OXYTOCIN 10 [USP'U]/ML
10 INJECTION, SOLUTION INTRAMUSCULAR; INTRAVENOUS ONCE AS NEEDED
Status: DISCONTINUED | OUTPATIENT
Start: 2025-07-31 | End: 2025-08-03 | Stop reason: HOSPADM

## 2025-07-31 RX ORDER — PHENYLEPHRINE HCL IN 0.9% NACL 0.4MG/10ML
SYRINGE (ML) INTRAVENOUS AS NEEDED
Status: DISCONTINUED | OUTPATIENT
Start: 2025-07-31 | End: 2025-07-31

## 2025-07-31 RX ORDER — METHYLERGONOVINE MALEATE 0.2 MG/ML
0.2 INJECTION INTRAVENOUS ONCE AS NEEDED
Status: DISCONTINUED | OUTPATIENT
Start: 2025-07-31 | End: 2025-08-03 | Stop reason: HOSPADM

## 2025-07-31 RX ORDER — ENOXAPARIN SODIUM 100 MG/ML
40 INJECTION SUBCUTANEOUS EVERY 24 HOURS
Status: DISCONTINUED | OUTPATIENT
Start: 2025-08-01 | End: 2025-08-03 | Stop reason: HOSPADM

## 2025-07-31 RX ORDER — METHYLERGONOVINE MALEATE 0.2 MG/ML
0.2 INJECTION INTRAVENOUS ONCE AS NEEDED
Status: DISCONTINUED | OUTPATIENT
Start: 2025-07-31 | End: 2025-07-31

## 2025-07-31 RX ORDER — MISOPROSTOL 200 UG/1
800 TABLET ORAL ONCE AS NEEDED
Status: DISCONTINUED | OUTPATIENT
Start: 2025-07-31 | End: 2025-07-31

## 2025-07-31 RX ORDER — KETOROLAC TROMETHAMINE 30 MG/ML
30 INJECTION, SOLUTION INTRAMUSCULAR; INTRAVENOUS EVERY 6 HOURS
Status: DISCONTINUED | OUTPATIENT
Start: 2025-07-31 | End: 2025-07-31

## 2025-07-31 RX ORDER — HYDRALAZINE HYDROCHLORIDE 20 MG/ML
5 INJECTION INTRAMUSCULAR; INTRAVENOUS ONCE AS NEEDED
Status: DISCONTINUED | OUTPATIENT
Start: 2025-07-31 | End: 2025-08-03 | Stop reason: HOSPADM

## 2025-07-31 RX ORDER — DIPHENHYDRAMINE HYDROCHLORIDE 50 MG/ML
50 INJECTION, SOLUTION INTRAMUSCULAR; INTRAVENOUS ONCE
Status: COMPLETED | OUTPATIENT
Start: 2025-07-31 | End: 2025-07-31

## 2025-07-31 RX ORDER — OXYCODONE HYDROCHLORIDE 5 MG/1
10 TABLET ORAL EVERY 4 HOURS PRN
Status: DISCONTINUED | OUTPATIENT
Start: 2025-08-01 | End: 2025-07-31

## 2025-07-31 RX ORDER — ONDANSETRON HYDROCHLORIDE 2 MG/ML
4 INJECTION, SOLUTION INTRAVENOUS EVERY 6 HOURS PRN
Status: DISCONTINUED | OUTPATIENT
Start: 2025-07-31 | End: 2025-07-31

## 2025-07-31 RX ORDER — SIMETHICONE 80 MG
80 TABLET,CHEWABLE ORAL 4 TIMES DAILY PRN
Status: DISCONTINUED | OUTPATIENT
Start: 2025-07-31 | End: 2025-07-31

## 2025-07-31 RX ORDER — ONDANSETRON 4 MG/1
4 TABLET, FILM COATED ORAL EVERY 6 HOURS PRN
Status: DISCONTINUED | OUTPATIENT
Start: 2025-07-31 | End: 2025-07-31

## 2025-07-31 RX ORDER — MISOPROSTOL 200 UG/1
800 TABLET ORAL ONCE AS NEEDED
Status: DISCONTINUED | OUTPATIENT
Start: 2025-07-31 | End: 2025-08-03 | Stop reason: HOSPADM

## 2025-07-31 RX ORDER — TRANEXAMIC ACID 1 G/10ML
1000 INJECTION, SOLUTION INTRAVENOUS ONCE AS NEEDED
Status: DISCONTINUED | OUTPATIENT
Start: 2025-07-31 | End: 2025-08-03 | Stop reason: HOSPADM

## 2025-07-31 RX ORDER — TRANEXAMIC ACID 10 MG/ML
1000 INJECTION, SOLUTION INTRAVENOUS ONCE AS NEEDED
Status: DISCONTINUED | OUTPATIENT
Start: 2025-07-31 | End: 2025-07-31

## 2025-07-31 RX ORDER — ADHESIVE BANDAGE
10 BANDAGE TOPICAL
Status: DISCONTINUED | OUTPATIENT
Start: 2025-07-31 | End: 2025-07-31

## 2025-07-31 RX ORDER — ONDANSETRON HYDROCHLORIDE 2 MG/ML
4 INJECTION, SOLUTION INTRAVENOUS EVERY 6 HOURS PRN
Status: DISCONTINUED | OUTPATIENT
Start: 2025-07-31 | End: 2025-08-03 | Stop reason: HOSPADM

## 2025-07-31 RX ORDER — ACETAMINOPHEN 325 MG/1
975 TABLET ORAL EVERY 6 HOURS
Status: DISCONTINUED | OUTPATIENT
Start: 2025-07-31 | End: 2025-07-31

## 2025-07-31 RX ORDER — DIPHENHYDRAMINE HYDROCHLORIDE 50 MG/ML
25 INJECTION, SOLUTION INTRAMUSCULAR; INTRAVENOUS EVERY 4 HOURS PRN
Status: DISCONTINUED | OUTPATIENT
Start: 2025-07-31 | End: 2025-08-03 | Stop reason: HOSPADM

## 2025-07-31 RX ORDER — ADHESIVE BANDAGE
10 BANDAGE TOPICAL
Status: DISCONTINUED | OUTPATIENT
Start: 2025-07-31 | End: 2025-08-03 | Stop reason: HOSPADM

## 2025-07-31 RX ORDER — TRANEXAMIC ACID 1 G/10ML
INJECTION, SOLUTION INTRAVENOUS AS NEEDED
Status: DISCONTINUED | OUTPATIENT
Start: 2025-07-31 | End: 2025-07-31

## 2025-07-31 RX ORDER — IBUPROFEN 600 MG/1
600 TABLET, FILM COATED ORAL EVERY 6 HOURS
Status: DISCONTINUED | OUTPATIENT
Start: 2025-08-01 | End: 2025-07-31

## 2025-07-31 RX ORDER — FENTANYL CITRATE 50 UG/ML
INJECTION, SOLUTION INTRAMUSCULAR; INTRAVENOUS AS NEEDED
Status: DISCONTINUED | OUTPATIENT
Start: 2025-07-31 | End: 2025-07-31

## 2025-07-31 RX ORDER — LIDOCAINE HCL/EPINEPHRINE/PF 2%-1:200K
VIAL (ML) INJECTION AS NEEDED
Status: DISCONTINUED | OUTPATIENT
Start: 2025-07-31 | End: 2025-07-31

## 2025-07-31 RX ORDER — NALOXONE HYDROCHLORIDE 0.4 MG/ML
0.1 INJECTION, SOLUTION INTRAMUSCULAR; INTRAVENOUS; SUBCUTANEOUS EVERY 5 MIN PRN
Status: DISCONTINUED | OUTPATIENT
Start: 2025-07-31 | End: 2025-08-03 | Stop reason: HOSPADM

## 2025-07-31 RX ORDER — POLYETHYLENE GLYCOL 3350 17 G/17G
17 POWDER, FOR SOLUTION ORAL 2 TIMES DAILY PRN
Status: DISCONTINUED | OUTPATIENT
Start: 2025-07-31 | End: 2025-07-31

## 2025-07-31 RX ORDER — DIPHENHYDRAMINE HCL 25 MG
25 CAPSULE ORAL EVERY 4 HOURS PRN
Status: DISCONTINUED | OUTPATIENT
Start: 2025-07-31 | End: 2025-08-03 | Stop reason: HOSPADM

## 2025-07-31 RX ORDER — LIDOCAINE 560 MG/1
1 PATCH PERCUTANEOUS; TOPICAL; TRANSDERMAL
Status: DISCONTINUED | OUTPATIENT
Start: 2025-07-31 | End: 2025-08-03 | Stop reason: HOSPADM

## 2025-07-31 RX ORDER — OXYCODONE HYDROCHLORIDE 5 MG/1
10 TABLET ORAL EVERY 4 HOURS PRN
Refills: 0 | Status: DISCONTINUED | OUTPATIENT
Start: 2025-08-01 | End: 2025-08-03 | Stop reason: HOSPADM

## 2025-07-31 RX ADMIN — Medication 40 MCG: at 12:10

## 2025-07-31 RX ADMIN — LIDOCAINE HYDROCHLORIDE AND EPINEPHRINE 10 ML: 20; 5 INJECTION, SOLUTION EPIDURAL; INFILTRATION; INTRACAUDAL; PERINEURAL at 11:26

## 2025-07-31 RX ADMIN — FAMOTIDINE 20 MG: 10 INJECTION, SOLUTION INTRAVENOUS at 03:00

## 2025-07-31 RX ADMIN — DIPHENHYDRAMINE HYDROCHLORIDE 50 MG: 50 INJECTION, SOLUTION INTRAMUSCULAR; INTRAVENOUS at 09:02

## 2025-07-31 RX ADMIN — KETOROLAC TROMETHAMINE 30 MG: 30 INJECTION, SOLUTION INTRAMUSCULAR at 18:37

## 2025-07-31 RX ADMIN — Medication 8 MCG: at 10:50

## 2025-07-31 RX ADMIN — ONDANSETRON 4 MG: 2 INJECTION INTRAMUSCULAR; INTRAVENOUS at 11:20

## 2025-07-31 RX ADMIN — FENTANYL CITRATE 50 MCG: 0.05 INJECTION, SOLUTION INTRAMUSCULAR; INTRAVENOUS at 11:31

## 2025-07-31 RX ADMIN — CEFAZOLIN 2 G: 330 INJECTION, POWDER, FOR SOLUTION INTRAMUSCULAR; INTRAVENOUS at 11:37

## 2025-07-31 RX ADMIN — LIDOCAINE HYDROCHLORIDE AND EPINEPHRINE 3 ML: 20; 5 INJECTION, SOLUTION EPIDURAL; INFILTRATION; INTRACAUDAL; PERINEURAL at 10:45

## 2025-07-31 RX ADMIN — MORPHINE SULFATE 2 MG: 0.5 INJECTION, SOLUTION EPIDURAL; INTRATHECAL; INTRAVENOUS at 12:40

## 2025-07-31 RX ADMIN — Medication 12 ML/HR: at 07:10

## 2025-07-31 RX ADMIN — AZITHROMYCIN 500 MG: 500 INJECTION, POWDER, LYOPHILIZED, FOR SOLUTION INTRAVENOUS at 11:33

## 2025-07-31 RX ADMIN — SODIUM CHLORIDE, SODIUM LACTATE, POTASSIUM CHLORIDE, AND CALCIUM CHLORIDE: 600; 310; 30; 20 INJECTION, SOLUTION INTRAVENOUS at 13:20

## 2025-07-31 RX ADMIN — ACETAMINOPHEN 975 MG: 325 TABLET ORAL at 18:37

## 2025-07-31 RX ADMIN — SODIUM CITRATE AND CITRIC ACID MONOHYDRATE 30 ML: 500; 334 SOLUTION ORAL at 11:13

## 2025-07-31 RX ADMIN — OXYTOCIN 600 MILLI-UNITS/MIN: 10 INJECTION, SOLUTION INTRAMUSCULAR; INTRAVENOUS at 11:54

## 2025-07-31 RX ADMIN — KETOROLAC TROMETHAMINE 30 MG: 30 INJECTION, SOLUTION INTRAMUSCULAR at 12:47

## 2025-07-31 RX ADMIN — SODIUM CHLORIDE, SODIUM LACTATE, POTASSIUM CHLORIDE, AND CALCIUM CHLORIDE: 600; 310; 30; 20 INJECTION, SOLUTION INTRAVENOUS at 12:04

## 2025-07-31 RX ADMIN — LIDOCAINE HYDROCHLORIDE AND EPINEPHRINE 1 ML: 20; 5 INJECTION, SOLUTION EPIDURAL; INFILTRATION; INTRACAUDAL; PERINEURAL at 10:50

## 2025-07-31 RX ADMIN — Medication 10 ML/HR: at 00:57

## 2025-07-31 RX ADMIN — ACETAMINOPHEN 650 MG: 650 SUPPOSITORY RECTAL at 13:15

## 2025-07-31 RX ADMIN — LIDOCAINE HYDROCHLORIDE AND EPINEPHRINE 2 ML: 20; 5 INJECTION, SOLUTION EPIDURAL; INFILTRATION; INTRACAUDAL; PERINEURAL at 11:41

## 2025-07-31 RX ADMIN — TRANEXAMIC ACID 1000 MG: 1 INJECTION, SOLUTION INTRAVENOUS at 12:07

## 2025-07-31 RX ADMIN — CEFAZOLIN SODIUM 1 G: 1 INJECTION, SOLUTION INTRAVENOUS at 02:47

## 2025-07-31 RX ADMIN — Medication 3 MCG: at 11:27

## 2025-07-31 ASSESSMENT — PAIN SCALES - GENERAL
PAINLEVEL_OUTOF10: 2
PAIN_LEVEL: 1
PAINLEVEL_OUTOF10: 0 - NO PAIN

## 2025-07-31 NOTE — CARE PLAN
Problem: Vaginal Birth or  Section  Goal: Fetal and maternal status remain reassuring during the birth process  Outcome: Progressing  Goal: Demonstrates labor coping techniques through delivery  Outcome: Progressing  Goal: Minimal s/sx of HDP and BP<160/110  Outcome: Progressing  Goal: No s/sx of infection through recovery  Outcome: Progressing  Goal: No s/sx of hemorrhage through recovery  Outcome: Progressing     Problem: Pain - Adult  Goal: Verbalizes/displays adequate comfort level or baseline comfort level  Outcome: Progressing     Problem: Safety - Adult  Goal: Free from fall injury  Outcome: Progressing     Problem: Discharge Planning  Goal: Discharge to home or other facility with appropriate resources  Outcome: Progressing   The patient's goals for the shift include safe delivery    The clinical goals for the shift include delivery

## 2025-07-31 NOTE — CARE PLAN
The clinical goals for the shift include safe and healthy delivery    Problem: Vaginal Birth or  Section  Goal: Fetal and maternal status remain reassuring during the birth process  Outcome: Progressing  Goal: Demonstrates labor coping techniques through delivery  Outcome: Progressing  Goal: Minimal s/sx of HDP and BP<160/110  Outcome: Progressing  Goal: No s/sx of infection through recovery  Outcome: Progressing  Goal: No s/sx of hemorrhage through recovery  Outcome: Progressing     Problem: Pain - Adult  Goal: Verbalizes/displays adequate comfort level or baseline comfort level  Outcome: Progressing     Problem: Safety - Adult  Goal: Free from fall injury  Outcome: Progressing     Problem: Discharge Planning  Goal: Discharge to home or other facility with appropriate resources  Outcome: Progressing

## 2025-07-31 NOTE — PROGRESS NOTES
Intrapartum Progress Note    Assessment/Plan   Mayra Coronado is a 25 y.o.  at 38w2d. MUSA: 2025, by Last Menstrual Period.     Continue with peanut ball, reassess in 30 min.  Discussed possible need for  section for arrest of dilatation if not complete in 30 min.      Subjective   Patient has been knees to chest.    Objective   Last Vitals:  Temp Pulse Resp BP MAP Pulse Ox   37 °C (98.6 °F) (!) 134 20 (!) 141/91 108 99 %     Vitals Min/Max Last 24 Hours:  Temp  Min: 36.7 °C (98.1 °F)  Max: 37.4 °C (99.4 °F)  Pulse  Min: 61  Max: 162  Resp  Min: 14  Max: 20  BP  Min: 122/79  Max: 171/105  MAP (mmHg)  Min: 90  Max: 125    Intake/Output:    Intake/Output Summary (Last 24 hours) at 2025 0946  Last data filed at 2025 0843  Gross per 24 hour   Intake --   Output 425 ml   Net -425 ml       Physical Examination:  Pitocin at 8 mU  Turned lateral recumbent, patient with some relief  FHR is 135 , with Early decelerations, Variable decelerations, and a  I tracing.    [place] reading: Q1.5-2   CERVIX: Lip/rim (Comment) (swollen lip) cm dilated, 90 % effaced, 0 to +1 station with contractions; MEMBRANES are SROM.  Softer.    Chaperone Present: Yes.  Chaperone Name/Title: Nurse and   Examination Chaperoned: Entire Physical Exam

## 2025-07-31 NOTE — L&D DELIVERY NOTE
Birth Operative Report    Patient Name: Mayra Coronado  : 2000  MRN: [id number]  Age: 25 y.o.    /Para:   Gestational Age: 38w2d    Date of Surgery: 2025    Operating Room Location: * No operating room entered *    Pre-op Diagnosis:  Intrauterine Pregnancy at 38w2d  Arrest of dilation at 9cm  Suspected Fetal Macrosomia    Post-op Diagnosis:  Same    Procedure:   Repeat Low Transverse  Section and Primary Low Transverse  Section    Surgery Category at [facility] Time: Confirmed Non-scheduled, Non-urgent    Surgeon:    * Rashad Beard MD - Primary    Resident/Fellow/Other Assistant:   April Avila MD    Anesthesia:  Type: epidural     CRNA: FERNANDO Epps; FERNANDO Saucedo    Surgical Staff:  Circulator: Sarah Wynn RN  Scrub Person: Sarah Alvarado RN; Balta Min SA     Preoperative Antibiotics: Ancef 2 g and Azithromycin 500 mg    Indication for Procedure:   25 y.o.  at 38w2d with arrest of dilation at 9cm in the setting of suspected fetal macrosomia     Informed Consent:  The risks, benefits, complications, and alternatives were discussed with the patient. The patient understood that the risks of  section include but are not limited to infection, bleeding, injury to nearby structures or organs, possible need for transfusion, and potential need for more surgery. The patient stated understanding and desired to proceed. All questions were answered. The patient's identity was confirmed, and the procedure verified as a  delivery. A Time Out was held and the above information confirmed.     Findings:   Narrow pelvis. Normal appearing gravid uterus and fallopian tubes. Bilateral ovaries noted to have simple appearing cysts. Amniotic fluid was meconium stained , Male infant in Vertex     presentation with head descended deep into the narrow pelvis, APGARS 2 , 4 .  Birth Weight 3.64 kg.    Description of  Procedure:   The patient was taken to the operating room where anesthesia was found to be adequate. Antibiotics were given for infection prophylaxis. Galeano catheter was inserted in the usual sterile fashion. She was prepped and draped in the normal sterile fashion in the dorsal supine position with leftward tilt.     A Pfannenstiel skin incision was made with scalpel. The incision was carried down to the fascia using the Bovie. The fascia was then nicked bilaterally using the Bovie then incised and extended laterally sharply. The superior aspect of the fascia was grasped with Kocher clamps. The underlying rectus muscles were dissected off. In a similar fashion, the inferior aspect of the fascia was elevated with Kocher clamps, and the rectus muscle was dissected off. The peritoneum was identified and entered bluntly. Peritoneal incision was extended superiorly and inferiorly with good visualization of the bladder.    The bladder blade was inserted, vesicouterine peritoneum was identified, and bladder flap was created. The lower uterine segment was then incised with a low transverse incision and was extended laterally with cephalocaudal traction. The infant was noted to be in the cephalic position with the fetal head deep into the maternal pelvis. Vaginally, a hand from below was provided by Corinne D-Amico, RN to help elevate the fetal head. The fetal head was then able to be elevated to the uterine incision and the infant was delivered. The cord was then immediately clamped and cut, and the baby was handed off.     The placenta was then removed without difficulty. The uterus was then exteriorized and cleared of all clots and debris. The uterine incision was repaired with 0 Vicryl suture in a running locked continuous fashion. A second imbricating layer of the same suture was placed. There was noted to be serosal oozing near the hysterotomy for which multiple figure of eight stitches with both 0-Vicryl and 3-0  Vicryl were placed. A dose of TXA was also administered. Surgicel was applied overlying the hysterotomy. Hemostasis was noted.    The ovaries and fallopian tubes were inspected and bilateral simple appearing cysts were noted on the ovaries. The uterus, fallopian tubes, and ovaries were then returned to the abdomen and pelvis. The uterine incision was re inspected and found to be hemostatic. The subfascial spaces were inspected and noted to be hemostatic. The fascia was then reapproximated with 0-Vicryl suture in a running continuous fashion. The subcutaneous area was re-approximated with interrupted 3-0 Vicryl. The skin was closed with 4-0 Vicryl.    The patient tolerated the procedure well. Sponge, instrument, and needle counts were correct and the patient was taken to the recovery room in good and stable condition.    There was no resident available for assistance.  Due to the complexity of the case, Dr. April Avila helped with retraction, visualization, hemostasis, and closure. The surgical assistant helped with retraction, visualization, delivery of infant, and hemostasis.     Additional Procedures:  None    Complications:  None    Quantitative Blood Loss:   Delivery Blood Loss: 1250 mL (7/31/2025 11:40 AM - 7/31/2025  4:05 PM)         Anesthesia Record               Intraprocedure I/O Totals          Intake    Tranexamic Acid 0.00 mL    The total shown is the total volume documented since Anesthesia Start was filed.    Oxytocin Drip 0.00 mL    The total shown is the total volume documented since Anesthesia Start was filed.    lactated Ringer's infusion 1800.00 mL    fentaNYL-ROPivacaine-NaCl (PF) 2-0.2 mcg/mL-% epidural infusion 203.33 mL    Total Intake 2003.33 mL       Output    Urine 50 mL    Total Blood Loss - Surgical Delivery (mL) 1230 mL    Total Output 1280 mL       Net    Net Volume 723.33 mL       Other (could not be determined as input or output)    Surgical Delivery Estimated Blood Loss (mL)  1230          Blood products: n/a     Blood Product Administration History       None          Uterotonics/Hemostatic Agent: IV Pitocin 30 units and IV TXA 1000 mg    Specimen:   Placenta  Delivered: 2025 11:53 AM  Appearance: Intact  Removal: Spontaneous    Disposition: pathology    Sponge/Instrument/Needle Counts: The sponge, lap and needle counts were correct.    Patient Disposition: Patient recovering on labor and delivery in stable condition.    [place], Maishaandi [[id number]]      Labor Events    Rupture date/time: 2025 0500  Rupture type: Spontaneous  Labor type: Induced Onset of Labor  Labor allowed to proceed with plans for an attempted vaginal birth?: Yes  Induction: Oxytocin  Induction date/time: 2025 1000  Induction indications: Prolonged ROM  Complications: Failure to Progress in First Stage       Labor Length    3rd stage: 0h 00m       Placenta    Placenta delivery date/time: 2025 11:53  Placenta removal: Spontaneous  Placenta appearance: Intact  Placenta disposition: pathology       Lacerations    Episiotomy: None  Perineal laceration: None  Other lacerations?: No  Repair suture: None       Anesthesia    Method: Epidural        Delivery    Birth date/time: 2025 11:53:00  Delivery type: , Low Transverse   categorization: primary   priority: non-scheduled, non-urgent  Indications for : Failure to Progress, Macrosomia  Complications: Failure to Progress in First Stage       Resuscitation    Method: Suctioning, Tactile stimulation, Supplemental oxygen, Continuous positive airway pressure (CPAP), Positive pressure ventilation (PPV)       Apgars    Living status: Living  Apgar Component Scores:  1 min.:  5 min.:  10 min.:  15 min.:  20 min.:    Skin color:  0  0  1  1     Heart rate:  2  2  2  2     Reflex irritability:  0  1  1  1     Muscle tone:  0  0  0  1     Respiratory effort:  0  1  1  2     Total:  2  4  5  7            Delivery  Providers    Delivering clinician: Rashad Beard MD   Provider Role    Sarah Wynn, RN Delivery Nurse    Ivonne Cruz, RN Nursery Nurse    Sarah Alvarado, RN Scrub Nurse

## 2025-07-31 NOTE — PROGRESS NOTES
Subjective:    In to evaluate patient at bedside. She is doing well since surgery. She is tolerating PO without issue. She is ambulating. She is urinating with modi catheter in place. She reports minimal bleeding and her pain is well controlled.     She denies HA, vision changes, lightheadedness, dizziness, CP, SOB, RUQ pain, calf tenderness.    Objective:    Visit Vitals  /79   Pulse (!) 123   Temp 37.1 °C (98.8 °F) (Oral)   Resp 18     Physical Exam:  General: Alert and oriented, in no acute distress, pleasant and positive.  Psych: Normal affect and mood. Able to answer questions appropriately.   Heart: Tachycardic.  Lungs: Clear to auscultation bilaterally.  Abdomen: Soft, non-tender, no rebound or guarding.  Uterus: Firm, below the umbilicus, not tender.  Incision: Bandage intact and in place overlying the incision.   Extremities: No calf tenderness bilaterally.    Lab Results   Component Value Date    WBC 25.4 (H) 2025    HGB 10.8 (L) 2025    HCT 32.3 (L) 2025    MCV 93 2025     2025     Lab Results   Component Value Date    GLUCOSE 134 (H) 2025    CALCIUM 7.9 (L) 2025     2025    K 3.9 2025    CO2 20 (L) 2025     2025    BUN 12 2025    CREATININE 0.97 2025     AST   Date Value Ref Range Status   2025 23 9 - 39 U/L Final     ALT   Date Value Ref Range Status   2025 7 7 - 45 U/L Final     Comment:     Patients treated with Sulfasalazine may generate falsely decreased results for ALT.     Assessment/Plan:    25 y.o.  s/p PLTCS on 25    #Post-operative  -POD #0 from above  -Patient is clinically doing well and hemodynamically stable  -Meeting immediate post op milestones appropriately  -Continue routine post operative care     #Dispo  -Patient is stable for transfer  -[place] [facility] to [place]  -R/B/A of transfer were reviewed with her thoroughly    -She voiced understanding of all of this information and desires to proceed with transfer  -Transfer forms were signed    Patient case discussed with [facility] attending, Dr. Monroy and is accepting of the transfer    Rashad Beard MD

## 2025-07-31 NOTE — PROGRESS NOTES
In to recheck patient , cervix unchanged at 9.5/90/0 with a thick, swollen lip. As was previously discussed with patient by the previous provider, concern for arrest of dilation/failure to progress at 9-9.5 cm. Patient would like to move forward with a primary  section at this time. R/B/A were thoroughly discussed with the patient and her partner. They voiced understanding and are in agreement with this plan of care and would like to move forward with PLTCS at this time.       Rashad Beard MD

## 2025-07-31 NOTE — ANESTHESIA POSTPROCEDURE EVALUATION
"Patient: Mayra Coronado    Procedure Summary       Date: 25 Room / Location: [facility]    Anesthesia Start: 1626 Anesthesia Stop: 25 1331    Procedure:  DELIVERY Diagnosis: (Failure to Progress)    Surgeons: Rashad Beard MD Responsible Provider: FERNANDO Saucedo    Anesthesia Type: epidural ASA Status: 2            Anesthesia Type: epidural  /70   Pulse 102   Temp 37 °C (98.6 °F) (Oral)   Resp 20   Ht 1.626 m (5' 4\")   Wt 95.6 kg (210 lb 12.8 oz)   LMP 2024   SpO2 100%   BMI 36.18 kg/m²         Anesthesia Post Evaluation    Patient location during evaluation: bedside  Patient participation: complete - patient participated  Level of consciousness: awake and alert  Pain score: 1  Pain management: adequate  Airway patency: patent  Cardiovascular status: acceptable  Respiratory status: acceptable  Hydration status: acceptable  Postoperative Nausea and Vomiting: none  Comments: Pt back to room is comfortable and VSS. Will be transferred downtown to be with baby later today.         There were no known notable events for this encounter.    "

## 2025-07-31 NOTE — PROGRESS NOTES
Intrapartum Progress Note    Assessment/Plan   Mayra Coronado is a 25 y.o.  at 38w2d. MUSA: 2025, by Last Menstrual Period.   Arrest of dilatation: Rotating to hands and knees to relieve pressure and cervical edema.   Benadryl 50mg IV now.   Maternal tachycardia - patient has been pushing before complete dilatation with every contraction, unbearable pressure. Afebrile. No malodorous fluid.  Preeclampsia w/o severe features  GBBS Positive: continue protocol. Adequately treated.    Subjective   Patient feels unbearable intense pressure with contractions only. She has had trouble resisting pushes.    Objective   Last Vitals:  Temp Pulse Resp BP MAP Pulse Ox   37 °C (98.6 °F) (!) 131 20 (!) 144/75 103 98 %     Vitals Min/Max Last 24 Hours:  Temp  Min: 36.7 °C (98.1 °F)  Max: 37.4 °C (99.4 °F)  Pulse  Min: 61  Max: 162  Resp  Min: 14  Max: 20  BP  Min: 122/79  Max: 171/105  MAP (mmHg)  Min: 90  Max: 125    Intake/Output:    Intake/Output Summary (Last 24 hours) at 2025 0842  Last data filed at 2025 0538  Gross per 24 hour   Intake --   Output 375 ml   Net -375 ml       Physical Examination:  GENERAL: Examination reveals a AAOx3 , gravid female   FHR is  130 , with Early decelerations, Variable decelerations, and a I  tracing.    [place] reading:    CERVIX: 9 cm dilated, 90 % effaced, 0 station; MEMBRANES are SROM.  Moderate cervical edema noted.     Chaperone Present: Yes.  Chaperone Name/Title:  and nurse  Examination Chaperoned: Entire Physical Exam    Lab Review:  Lab Results   Component Value Date    ABO O 2025    LABRH POS 2025    ABSCRN NEG 2025     Lab Results   Component Value Date    WBC 11.7 (H) 2025    HGB 12.9 2025    HCT 37.9 2025     2025     0   Lab Value Date/Time    GRPBSTREP SEE NOTE (A) 2025 1101     Lab Results   Component Value Date    GLUCOSE 93 2025     2025    K 3.5 2025     2025     "CO2 20 (L) 07/30/2025    ANIONGAP 15 07/30/2025    BUN 7 07/30/2025    CREATININE 0.67 07/30/2025    EGFR >90 07/30/2025    CALCIUM 8.9 07/30/2025    ALBUMIN 3.2 (L) 07/30/2025    PROT 6.0 (L) 07/30/2025    ALKPHOS 189 (H) 07/30/2025    ALT 8 07/30/2025    AST 18 07/30/2025    BILITOT 0.4 07/30/2025     0   Lab Value Date/Time    UTPCR 0.33 (H) 07/30/2025 1847    UTPCR 0.28 (H) 07/27/2025 1953     No results found for: \"LACTATE\"  "

## 2025-08-01 PROBLEM — F41.9 ANXIETY AND DEPRESSION: Status: ACTIVE | Noted: 2025-08-01

## 2025-08-01 PROBLEM — O14.00 PRE-ECLAMPSIA, MILD (HHS-HCC): Status: ACTIVE | Noted: 2025-08-01

## 2025-08-01 PROBLEM — F32.A ANXIETY AND DEPRESSION: Status: ACTIVE | Noted: 2025-08-01

## 2025-08-01 LAB
ABO GROUP (TYPE) IN BLOOD: NORMAL
ANTIBODY SCREEN: NORMAL
ERYTHROCYTE [DISTWIDTH] IN BLOOD BY AUTOMATED COUNT: 14.1 % (ref 11.5–14.5)
HCT VFR BLD AUTO: 37.4 % (ref 36–46)
HGB BLD-MCNC: 11 G/DL (ref 12–16)
MCH RBC QN AUTO: 32.2 PG (ref 26–34)
MCHC RBC AUTO-ENTMCNC: 29.4 G/DL (ref 32–36)
MCV RBC AUTO: 109 FL (ref 80–100)
NRBC BLD-RTO: 0 /100 WBCS (ref 0–0)
PLATELET # BLD AUTO: 217 X10*3/UL (ref 150–450)
RBC # BLD AUTO: 3.42 X10*6/UL (ref 4–5.2)
RH FACTOR (ANTIGEN D): NORMAL
WBC # BLD AUTO: 16.4 X10*3/UL (ref 4.4–11.3)

## 2025-08-01 PROCEDURE — 86901 BLOOD TYPING SEROLOGIC RH(D): CPT | Performed by: STUDENT IN AN ORGANIZED HEALTH CARE EDUCATION/TRAINING PROGRAM

## 2025-08-01 PROCEDURE — 85027 COMPLETE CBC AUTOMATED: CPT | Performed by: STUDENT IN AN ORGANIZED HEALTH CARE EDUCATION/TRAINING PROGRAM

## 2025-08-01 PROCEDURE — 36415 COLL VENOUS BLD VENIPUNCTURE: CPT | Performed by: STUDENT IN AN ORGANIZED HEALTH CARE EDUCATION/TRAINING PROGRAM

## 2025-08-01 PROCEDURE — 1210000001 HC SEMI-PRIVATE ROOM DAILY

## 2025-08-01 PROCEDURE — 2500000001 HC RX 250 WO HCPCS SELF ADMINISTERED DRUGS (ALT 637 FOR MEDICARE OP): Mod: SE

## 2025-08-01 PROCEDURE — 2500000004 HC RX 250 GENERAL PHARMACY W/ HCPCS (ALT 636 FOR OP/ED): Mod: SE

## 2025-08-01 RX ADMIN — ACETAMINOPHEN 975 MG: 325 TABLET, FILM COATED ORAL at 12:22

## 2025-08-01 RX ADMIN — IBUPROFEN 600 MG: 600 TABLET ORAL at 18:36

## 2025-08-01 RX ADMIN — KETOROLAC TROMETHAMINE 30 MG: 30 INJECTION, SOLUTION INTRAMUSCULAR; INTRAVENOUS at 06:48

## 2025-08-01 RX ADMIN — ENOXAPARIN SODIUM 40 MG: 100 INJECTION SUBCUTANEOUS at 09:13

## 2025-08-01 RX ADMIN — ACETAMINOPHEN 975 MG: 325 TABLET, FILM COATED ORAL at 06:47

## 2025-08-01 RX ADMIN — KETOROLAC TROMETHAMINE 30 MG: 30 INJECTION, SOLUTION INTRAMUSCULAR; INTRAVENOUS at 12:22

## 2025-08-01 RX ADMIN — ACETAMINOPHEN 975 MG: 325 TABLET, FILM COATED ORAL at 18:36

## 2025-08-01 RX ADMIN — KETOROLAC TROMETHAMINE 30 MG: 30 INJECTION, SOLUTION INTRAMUSCULAR; INTRAVENOUS at 00:36

## 2025-08-01 RX ADMIN — ACETAMINOPHEN 975 MG: 325 TABLET, FILM COATED ORAL at 00:36

## 2025-08-01 RX ADMIN — DULOXETINE 30 MG: 30 CAPSULE, DELAYED RELEASE ORAL at 18:36

## 2025-08-01 RX ADMIN — POLYETHYLENE GLYCOL 3350 17 G: 17 POWDER, FOR SOLUTION ORAL at 09:25

## 2025-08-01 ASSESSMENT — PAIN SCALES - GENERAL
PAINLEVEL_OUTOF10: 2
PAINLEVEL_OUTOF10: 3
PAINLEVEL_OUTOF10: 0 - NO PAIN

## 2025-08-01 ASSESSMENT — PAIN DESCRIPTION - DESCRIPTORS
DESCRIPTORS: SORE
DESCRIPTORS: SORE

## 2025-08-01 NOTE — CARE PLAN
Problem: Vaginal Birth or  Section  Goal: Minimal s/sx of HDP and BP<160/110  Outcome: Progressing     Problem: Pain - Adult  Goal: Verbalizes/displays adequate comfort level or baseline comfort level  Outcome: Progressing     Problem: Safety - Adult  Goal: Free from fall injury  Outcome: Progressing     Problem: Discharge Planning  Goal: Discharge to home or other facility with appropriate resources  Outcome: Progressing     Problem: Postpartum  Goal: Minimal s/sx of HDP and BP<160/110  Outcome: Progressing  Goal: Incisions, wounds, or drain sites healing without S/S of infection  Outcome: Progressing  Goal: No s/sx infection  Outcome: Progressing  Goal: No s/sx of hemorrhage  Outcome: Progressing   The patient's goals for the shift include visit baby in NICU    The clinical goals for the shift include Patient will continue to meet PP milestones    Patient meeting all PP day 1 milestones. Patient's fundal and lochia assessment wnl.

## 2025-08-01 NOTE — H&P
Postpartum Progress Note    Assessment/Plan   Mayra Coronado is a 25 y.o., , who presents as a transfer from OSH for baby in NICU currently on POD 0 from pLTCS at 38w2d in s/o arrest of active phase at 9cm that was c/b PPH, EBL 1.2L, requiring TXA.    s/p LTCS on   - EBL 1250mL, preop hgb 12.9 -> 10.8  - f/u am CBC  - continue routine postoperative care  - modi removed at OSH, follow up void  - pain well controlled, transition to PO meds   - dvt risk score 7, for lovenox ppx    PEC w/o SF  - dx by MRBP > 4hrs apart, P:C 0.33  - HELLP labs neg  - BPs currently normotensive to mild range  - Asymptomatic  - Will continue to monitor    Maternal Wellbeing  - Hx of anxiety & depression, on Cymbalta 30mg nightly  - Plans to breastfeed, pumping, lactation consult PRN  - Plans for interval Copper IUD    Dispo  - appropriate for d/c on POD #3 if remains stable  - for BP check and incision check in 1 week upon discharge    Discussed with Dr. Curly Baer MD  PGY-1      Subjective   Her pain is well controlled with current medications  She is not passing flatus  She is ambulating well  She is tolerating a Adult diet Regular  She reports no breast or nursing problems  She denies emotional concerns today   Her plan for contraception is interval Copper IUD     Patient reports feeling well overall, resting comfortably in bed. States that she has been worried about baby in NICU, but that he has been improving. She endorses bilateral leg swelling that has worsened slightly over the last day. Denies significant abdominal pain, vaginal bleeding, HA, vision change, SOB, CP, RUQ pain, fever, or chills.    Objective     Last Vitals:  Temp Pulse Resp BP MAP Pulse Ox   37.1 °C (98.8 °F) 108 20 (!) 137/90 105 98 %     Vitals Min/Max Last 24 Hours:  Temp  Min: 36.8 °C (98.2 °F)  Max: 37.4 °C (99.4 °F)  Pulse  Min: 61  Max: 162  Resp  Min: 14  Max: 20  BP  Min: 114/64  Max: 171/105  MAP (mmHg)  Min: 82  Max:  125    Intake/Output:     Intake/Output Summary (Last 24 hours) at 7/31/2025 2041  Last data filed at 7/31/2025 2015  Gross per 24 hour   Intake 2003.33 ml   Output 2075 ml   Net -71.67 ml       Physical Exam:  General: In no acute distress  HEENT: Normocephalic, atraumatic  Pulm: CTAB, Breathing comfortably on room air  CV: Elevated HR, regular rhythm, normal S1/S2, Warm, well perfused  Abdomen: soft, nondistended, nontender to palpation, fundus firm below umbilicus  Extremities: +2 pitting edema bilaterally, moving all extremities  Neuro: Alert and oriented  Psych: Appropriate mood and affect    Lab Data:  Lab Results   Component Value Date    WBC 25.4 (H) 07/31/2025    HGB 10.8 (L) 07/31/2025    HCT 32.3 (L) 07/31/2025     07/31/2025     Lab Results   Component Value Date    GLUCOSE 134 (H) 07/31/2025     07/31/2025    K 3.9 07/31/2025     07/31/2025    CO2 20 (L) 07/31/2025    ANIONGAP 14 07/31/2025    BUN 12 07/31/2025    CREATININE 0.97 07/31/2025    EGFR 83 07/31/2025    CALCIUM 7.9 (L) 07/31/2025    ALBUMIN 2.8 (L) 07/31/2025    PROT 5.0 (L) 07/31/2025    ALKPHOS 152 (H) 07/31/2025    ALT 7 07/31/2025    AST 23 07/31/2025    BILITOT 0.5 07/31/2025     0   Lab Value Date/Time    UTPCR 0.33 (H) 07/30/2025 1847    UTPCR 0.28 (H) 07/27/2025 1953

## 2025-08-01 NOTE — LACTATION NOTE
Lactation Consultant Note  Lactation Consultation  Reason for Consult: Initial assessment  Consultant Name: Letha Khan RN IBCLC    Maternal Information  Has mother  before?: No  Infant to breast within first 2 hours of birth?: No  Breastfeeding Delayed Due to: Infant status  Exclusive Pump and Bottle Feed: No (For now until baby is cleared to try latching)    Maternal Assessment  Breast Assessment: Medium, Soft, Compressible, Symmetrical  Nipple Assessment: Intact, Erect, Large diameter, Red/inflamed  [place] Assessment: Normal    Infant Assessment       Feeding Assessment       LATCH TOOL       Breast Pump  Pump: Hospital grade electric pump  Frequency: 8-10 times per day  Duration: Initiate phase  Breast Shield Size and Type: 21 mm    Other OB Lactation Tools  Lactation Tools: Lanolin, Flanges    Patient Follow-up  Inpatient Lactation Follow-up Needed : Yes  Outpatient Lactation Follow-up: Recommended    Other OB Lactation Documentation  Maternal Risk Factors:  delivery, Hypertension, Preeclampsia  Infant Risk Factors: Early term birth 37-39 weeks    Recommendations/Summary  Mom was pumping when LC rounded. Mom denied any pain but nipple looked very swollen inside of the flange. Mom had inserts at her bedside. LOVE asked Mom if she can resize her. LOVE sized Mom's nipples to be 21mm and she was using a 21mm flange. I recommended Mom use a 24mm flange which she said felt much better. Nipple appeared red and inflammed when taken out of the flange. LC provided Lanolin or encouraged her to express some colostrum and let dry on the nipple for comfort. Mom using Symphony bedside pump. LOVE reviewed the pump and pump parts as well as pumping frequency and duration using the initiate phase. Mom had hear wearable pump at the bedside which LOVE recommended to not use as primary pump until later in her journey and I explained why. LOVE told Mom about the Pump rental program for NICU Moms. LOVE reviewed water intake and  the recommendation of eating an additional 500 calories a day. LC encouraged parents to call if they need any assistance and to also utilize NICU lactation when she is ready to latch the baby.   Outpatient lactation services were recommended and provided as well as pump part cleaning sheet and .

## 2025-08-01 NOTE — CARE PLAN
Problem: Vaginal Birth or  Section  Goal: Fetal and maternal status remain reassuring during the birth process  Outcome: Met  Goal: Demonstrates labor coping techniques through delivery  Outcome: Met  Goal: Minimal s/sx of HDP and BP<160/110  Outcome: Progressing  Goal: No s/sx of infection through recovery  Outcome: Met  Goal: No s/sx of hemorrhage through recovery  Outcome: Met     Problem: Pain - Adult  Goal: Verbalizes/displays adequate comfort level or baseline comfort level  Outcome: Progressing     Problem: Safety - Adult  Goal: Free from fall injury  Outcome: Progressing     Problem: Discharge Planning  Goal: Discharge to home or other facility with appropriate resources  Outcome: Progressing     Problem: Postpartum  Goal: Minimal s/sx of HDP and BP<160/110  Outcome: Progressing  Goal: Experiences normal postpartum course  Outcome: Progressing  Goal: Appropriate maternal -  bonding  Outcome: Progressing  Goal: Establish and maintain infant feeding pattern for adequate nutrition  Outcome: Progressing  Goal: Incisions, wounds, or drain sites healing without S/S of infection  Outcome: Progressing  Goal: No s/sx infection  Outcome: Progressing  Goal: No s/sx of hemorrhage  Outcome: Progressing     Problem:  Recovery Care  Goal: Verbalizes understanding of post-op instructions  Outcome: Progressing  Goal: Manages discomfort  Outcome: Progressing  Goal: Dressing intact until removed with any drainage marked  Outcome: Progressing  Goal: Patient vital signs are stable  Outcome: Progressing  Goal: Urine output is 0.5 mL/kg/hr or more  Outcome: Progressing  Goal: Fundus firm at midline  Outcome: Progressing     The patient's goals for the shift include Visit infant in NICU, pump for infant, get some rest    The clinical goals for the shift include VSS, assessments WNL, begin to meet post-op and postpartum milestones    Over the shift, the patient did make progress toward the following goals.

## 2025-08-01 NOTE — PROGRESS NOTES
08/01/25 1325   Discharge Planning   Home or Post Acute Services   (blood pressure monitor)     Patient meets criteria for home monitoring of blood pressure post discharge.  Reason: current preeclampsia without severe features. Met with patient to assess for availability of home BP monitor.  Patient stated she owns home BP monitor.  Patient educated on importance of continuing to monitor BP at home, recording BP on home monitoring log and s/sx of when to call her provider.  Pt verbalized understanding the above information.

## 2025-08-02 LAB — HOLD SPECIMEN: NORMAL

## 2025-08-02 PROCEDURE — 2500000001 HC RX 250 WO HCPCS SELF ADMINISTERED DRUGS (ALT 637 FOR MEDICARE OP): Mod: SE

## 2025-08-02 PROCEDURE — 2500000004 HC RX 250 GENERAL PHARMACY W/ HCPCS (ALT 636 FOR OP/ED): Mod: SE

## 2025-08-02 PROCEDURE — 1210000001 HC SEMI-PRIVATE ROOM DAILY

## 2025-08-02 RX ADMIN — IBUPROFEN 600 MG: 600 TABLET ORAL at 13:02

## 2025-08-02 RX ADMIN — OXYCODONE HYDROCHLORIDE 5 MG: 5 TABLET ORAL at 01:57

## 2025-08-02 RX ADMIN — DULOXETINE 30 MG: 30 CAPSULE, DELAYED RELEASE ORAL at 20:28

## 2025-08-02 RX ADMIN — ACETAMINOPHEN 975 MG: 325 TABLET, FILM COATED ORAL at 20:28

## 2025-08-02 RX ADMIN — IBUPROFEN 600 MG: 600 TABLET ORAL at 06:33

## 2025-08-02 RX ADMIN — IBUPROFEN 600 MG: 600 TABLET ORAL at 20:28

## 2025-08-02 RX ADMIN — ENOXAPARIN SODIUM 40 MG: 100 INJECTION SUBCUTANEOUS at 08:28

## 2025-08-02 RX ADMIN — ACETAMINOPHEN 975 MG: 325 TABLET, FILM COATED ORAL at 00:20

## 2025-08-02 RX ADMIN — ACETAMINOPHEN 975 MG: 325 TABLET, FILM COATED ORAL at 06:33

## 2025-08-02 RX ADMIN — ACETAMINOPHEN 975 MG: 325 TABLET, FILM COATED ORAL at 13:02

## 2025-08-02 RX ADMIN — IBUPROFEN 600 MG: 600 TABLET ORAL at 00:20

## 2025-08-02 ASSESSMENT — PAIN DESCRIPTION - DESCRIPTORS
DESCRIPTORS: BURNING
DESCRIPTORS: ACHING;SORE
DESCRIPTORS: SORE;BURNING
DESCRIPTORS: SORE
DESCRIPTORS: SORE

## 2025-08-02 ASSESSMENT — PAIN - FUNCTIONAL ASSESSMENT
PAIN_FUNCTIONAL_ASSESSMENT: 0-10

## 2025-08-02 ASSESSMENT — PAIN DESCRIPTION - ORIENTATION
ORIENTATION: LOWER
ORIENTATION: LOWER

## 2025-08-02 ASSESSMENT — PAIN SCALES - GENERAL
PAIN_LEVEL: 0
PAINLEVEL_OUTOF10: 3
PAINLEVEL_OUTOF10: 2
PAINLEVEL_OUTOF10: 3
PAINLEVEL_OUTOF10: 6
PAINLEVEL_OUTOF10: 3
PAINLEVEL_OUTOF10: 2

## 2025-08-02 ASSESSMENT — PAIN DESCRIPTION - LOCATION
LOCATION: ABDOMEN
LOCATION: INCISION
LOCATION: ABDOMEN

## 2025-08-02 NOTE — PROGRESS NOTES
Postpartum Progress Note    Assessment/Plan   Mayra Coronado is a 25 y.o., , who delivered at 38w2d gestation    Now PPD#2 s/p , Low Transverse on 2025  - Continue routine postpartum care  - Pain well controlled on po medications  - DVT risk score DVT Score (IF A SCORE IS NOT CALCULATING, MUST SELECT A BMI TO COMPLETE): 7 , ppx with SCDs, ambulation, and lovenox  - RH positive, rhogam not indicated  - Hgb:   Results from last 7 days   Lab Units 25  0915 25  1602 25  0913   HEMOGLOBIN g/dL 11.0* 10.8* 12.9        PEC w/o SF  - diagnosed by 2 mild range BPs > 4 hours apart and elevated P:C  - asymptomatic  - BP normotensive  - no meds  - HELLP labs negative and P:C 0.33  - will continue to monitor, discussed 3 day stay for BP monitoring and pt states understanding  - BP cuff for home     Maternal Well-Being  - Vitals stable  - anxiety/depression, mood stable, symptoms controlled on Cymbalta 30 mg   - All questions and concerns address    [place] Feeding  - Breastfeeding/pumping encouraged  - Lactation consult prn    Contraception  - Defers to 6wk visit, interested in ParaGard IUD  - Education provided    Dispo  - Anticipate d/c on PPD #3 if meeting all postpartum milestones  - Follow-up in 2-5 days for BP check  - Follow-up in 1-2wks for incision check  - Follow-up in 4-6wks with primary MARK Lott-CNP     Assessment & Plan  38 weeks gestation of pregnancy ([facility])     delivery delivered ([facility])    Pre-eclampsia, mild ([facility])    Anxiety and depression    Pregnancy Problems (from 24 to present)       Problem Noted Diagnosed Resolved     delivery delivered ([facility]) 2025 by Mireille Galloway PA-C  No    Priority:  Medium       Pre-eclampsia, mild ([facility]) 2025 by Mireille Galloway PA-C  No    Priority:  Medium       Anxiety and depression 2025 by Mireille Galloway PA-C  No    Priority:  Medium       38 weeks gestation of  pregnancy ([facility]) 2025 by Shannon Samaniego MD  No    Priority:  Medium               Subjective     Mayra Coronado is PPD#2 s/p  who reports feeling overall well.  No acute events overnight.  Voiding spontaneously, passing flatus.  Pain well controlled on PO meds.  Light lochia. Tolerating diet.  Denies HA, SOB, RUQ pain, vision changes.  Denies dizziness/lightheadedness, SOB, palpitations, extreme fatigue, heavy bleeding      Objective   Allergies:   Adhesive tape-silicones and Amoxicillin         Last Vitals:  Temp Pulse Resp BP MAP Pulse Ox   36.2 °C (97.2 °F) 110 18 123/73   100 %     Vitals Min/Max Last 24 Hours:  Temp  Min: 36.2 °C (97.2 °F)  Max: 36.9 °C (98.4 °F)  Pulse  Min: 95  Max: 110  Resp  Min: 18  Max: 18  BP  Min: 121/82  Max: 132/90    Intake/Output:   No intake or output data in the 24 hours ending 25 1118    Physical Exam:  General: examination reveals a well developed, well nourished, female, in no acute distress. She is alert and cooperative.  HEENT: external ears normal. Nose normal, no erythema or discharge.  Neck: supple, no significant adenopathy  Lungs: breathing even and unlabored, lungs clear  Cardiac: warm and well perfused, heart rate regular  Fundus: firm and below umbilicus, lochia light  Abdominal: soft, non-tender, non-distended, bowel sounds active  Extremities: no redness or tenderness in the calves or thighs, edema: non-pitting BLE  Neurological: alert, oriented, normal speech, no focal findings or movement disorder noted.  Psychological: awake and alert; oriented to person, place, and time.  Skin: incision clean, dry, intact, well approximated, no redness, drainage, or warmth     Lab Data:  Labs in chart were reviewed.

## 2025-08-02 NOTE — CARE PLAN
Problem: Vaginal Birth or  Section  Goal: Minimal s/sx of HDP and BP<160/110  Outcome: Progressing     Problem: Pain - Adult  Goal: Verbalizes/displays adequate comfort level or baseline comfort level  Outcome: Progressing     Problem: Safety - Adult  Goal: Free from fall injury  Outcome: Progressing     Problem: Discharge Planning  Goal: Discharge to home or other facility with appropriate resources  Outcome: Progressing     Problem: Postpartum  Goal: Minimal s/sx of HDP and BP<160/110  Outcome: Progressing  Goal: Incisions, wounds, or drain sites healing without S/S of infection  Outcome: Progressing  Goal: No s/sx infection  Outcome: Progressing  Goal: No s/sx of hemorrhage  Outcome: Progressing   The patient's goals for the shift include latch baby for feeds    The clinical goals for the shift include Patient will continue to meet PP milestones    Patient meeting all PP milestones. Patients incision is free from s/sx of infection and fundal and lochia assessments has remained wnl.

## 2025-08-02 NOTE — ANESTHESIA POSTPROCEDURE EVALUATION
Patient: Mayra Coronado    Procedure Summary       Date: 25 Room / Location: [facility]    Anesthesia Start: 1626 Anesthesia Stop: 25 1331    Procedure:  DELIVERY Diagnosis: (Failure to Progress)    Surgeons: Rashad Beard MD Responsible Provider: FERNANDO Saucedo    Anesthesia Type: epidural ASA Status: 2            Anesthesia Type: epidural    Vitals Value Taken Time   /87 25 14:50   Temp 37 °C (98.6 °F) 25 13:19   Pulse 123 25 15:13   Resp 18 25 14:20   SpO2 99 % 25 15:13       Anesthesia Post Evaluation    Patient location during evaluation: floor  Patient participation: complete - patient participated  Level of consciousness: awake and alert  Pain score: 0  Pain management: adequate  Airway patency: patent  Cardiovascular status: acceptable  Respiratory status: acceptable  Hydration status: acceptable  Postoperative Nausea and Vomiting: none  Comments: Mayra Coronado is a 25 y.o., , who had a , Low Transverse delivery on 2025 at 38w2d and is now POD2.    She had Neuraxial Anesthesia without immediate complications noted.       Pain well controlled    -------------------------              25                    1613        -------------------------   BP:         130/81        Pulse:        105         Resp:         18          Temp:   36.1 °C (97 °F)   SpO2:        100%        -------------------------    Neuraxial site assessed. No visible redness, swelling, or drainage. Patient able to ambulate and move all extremities without difficulty. Able to void. No complaints of nausea/vomiting. Tolerating PO intake well. No signs or symptoms of PDPH.    Anesthesia will sign off     Bethany Olvera MD         There were no known notable events for this encounter.

## 2025-08-02 NOTE — LACTATION NOTE
Lactation Consultant Note  Lactation Consultation  Reason for Consult: Follow-up assessment, NICU baby  Consultant Name: AIDA Coffey RN IBCLC    Maternal Information  Has mother  before?: No  Infant to breast within first 2 hours of birth?: No  Breastfeeding Delayed Due to: Infant status  Exclusive Pump and Bottle Feed: No    Maternal Assessment  Breast Assessment: Other (Comment) (deferred)  Nipple Assessment: Sore (deferred)    Infant Assessment  Infant Behavior: Other (Comment) (infant in NICU)    Feeding Assessment       LATCH TOOL       Breast Pump       Other OB Lactation Tools       Patient Follow-up  Inpatient Lactation Follow-up Needed : Yes    Other OB Lactation Documentation  Maternal Risk Factors: Preeclampsia,  delivery    Recommendations/Summary    I met briefly with this mother whose infant was in the NICU. The mother was eating lunch at the time of the visit, as well as, having visitors. The reports that her infant is latching well at the breast in the NICU and that she is receiving assistance from the NICU staff with latch. She also plans to return shortly to the NICU tofeed her infant. I advised of the availability of pumping assistance from the [facility] lactation staff and asked her to call out, if needed.

## 2025-08-02 NOTE — LACTATION NOTE
This note was copied from a baby's chart.  Lactation Consultant Note  Lactation Consultation   Sameera Schmitt RN, IBCLC    Recommendations/Summary  Met with mom at infant's bedside to introduce the availability of the [place] lactation service. Provided education on the topics of: benefits of breastfeeding, [employer] breast pump kit and usage, pumping schedule, cleaning and sanitizing of pump equipment, storage of milk, and kangaroo care. Mom has already begun pumping and brought over 2ml of EBM. Says that she already has a hands free pump at home. Discussed the availability of the NICU jacob [employer]ela [employer] pumps. Her goal is to both directly breastfeed and provide bottles so she can pump when she returns to work. Invited to reach out to lactation consultant as questions/concerns arise.

## 2025-08-02 NOTE — CARE PLAN
The patient's goals for the shift include rest and pump    The clinical goals for the shift include patient to continue to meet postpartum milestones    Over the shift, the patient did make progress toward the following goals.   Overnight, VS remained stable. Patient remains free of infection and PPH s/sx. Patient denies having any HDP s/sx. Fundus is firm, midline, U/U. Lochia is scant, rubra, and free of clots and odor. Patient is currently stable, resting in room with partner at bedside, and denies needing anything at this time.    Problem: Pain - Adult  Goal: Verbalizes/displays adequate comfort level or baseline comfort level  Outcome: Progressing     Problem: Safety - Adult  Goal: Free from fall injury  Outcome: Progressing     Problem: Postpartum  Goal: Minimal s/sx of HDP and BP<160/110  Outcome: Progressing  Goal: Experiences normal postpartum course  Outcome: Progressing  Goal: Appropriate maternal -  bonding  Outcome: Progressing  Goal: Establish and maintain infant feeding pattern for adequate nutrition  Outcome: Progressing  Goal: Incisions, wounds, or drain sites healing without S/S of infection  Outcome: Progressing  Goal: No s/sx infection  Outcome: Progressing  Goal: No s/sx of hemorrhage  Outcome: Progressing     Problem:  Recovery Care  Goal: Fundus firm at midline  Outcome: Progressing

## 2025-08-03 VITALS
OXYGEN SATURATION: 97 % | TEMPERATURE: 97.5 F | BODY MASS INDEX: 35.99 KG/M2 | WEIGHT: 210.8 LBS | SYSTOLIC BLOOD PRESSURE: 139 MMHG | RESPIRATION RATE: 18 BRPM | HEIGHT: 64 IN | HEART RATE: 110 BPM | DIASTOLIC BLOOD PRESSURE: 91 MMHG

## 2025-08-03 PROCEDURE — 2500000004 HC RX 250 GENERAL PHARMACY W/ HCPCS (ALT 636 FOR OP/ED): Mod: SE

## 2025-08-03 PROCEDURE — 2500000002 HC RX 250 W HCPCS SELF ADMINISTERED DRUGS (ALT 637 FOR MEDICARE OP, ALT 636 FOR OP/ED): Mod: SE

## 2025-08-03 PROCEDURE — 99238 HOSP IP/OBS DSCHRG MGMT 30/<: CPT | Performed by: NURSE PRACTITIONER

## 2025-08-03 PROCEDURE — 2500000001 HC RX 250 WO HCPCS SELF ADMINISTERED DRUGS (ALT 637 FOR MEDICARE OP): Mod: SE

## 2025-08-03 RX ORDER — NIFEDIPINE 30 MG/1
30 TABLET, FILM COATED, EXTENDED RELEASE ORAL
Qty: 30 TABLET | Refills: 1 | Status: SHIPPED | OUTPATIENT
Start: 2025-08-04 | End: 2025-08-03

## 2025-08-03 RX ORDER — NIFEDIPINE 30 MG/1
30 TABLET, FILM COATED, EXTENDED RELEASE ORAL
Status: DISCONTINUED | OUTPATIENT
Start: 2025-08-03 | End: 2025-08-03 | Stop reason: HOSPADM

## 2025-08-03 RX ORDER — ACETAMINOPHEN 325 MG/1
975 TABLET ORAL EVERY 6 HOURS
Qty: 360 TABLET | Refills: 0 | Status: SHIPPED | OUTPATIENT
Start: 2025-08-03 | End: 2025-08-03

## 2025-08-03 RX ORDER — POLYETHYLENE GLYCOL 3350 17 G/17G
17 POWDER, FOR SOLUTION ORAL 2 TIMES DAILY PRN
Qty: 14 PACKET | Refills: 1 | Status: SHIPPED | OUTPATIENT
Start: 2025-08-03 | End: 2025-08-03

## 2025-08-03 RX ORDER — IBUPROFEN 600 MG/1
600 TABLET, FILM COATED ORAL EVERY 6 HOURS
Qty: 120 TABLET | Refills: 0 | Status: SHIPPED | OUTPATIENT
Start: 2025-08-03 | End: 2025-08-03

## 2025-08-03 RX ORDER — ACETAMINOPHEN 325 MG/1
975 TABLET ORAL EVERY 6 HOURS
Qty: 360 TABLET | Refills: 0 | Status: SHIPPED | OUTPATIENT
Start: 2025-08-03 | End: 2025-09-02

## 2025-08-03 RX ORDER — IBUPROFEN 600 MG/1
600 TABLET, FILM COATED ORAL EVERY 6 HOURS
Qty: 120 TABLET | Refills: 0 | Status: SHIPPED | OUTPATIENT
Start: 2025-08-03 | End: 2025-09-02

## 2025-08-03 RX ORDER — POLYETHYLENE GLYCOL 3350 17 G/17G
17 POWDER, FOR SOLUTION ORAL 2 TIMES DAILY PRN
Qty: 14 PACKET | Refills: 1 | Status: SHIPPED | OUTPATIENT
Start: 2025-08-03

## 2025-08-03 RX ORDER — NIFEDIPINE 30 MG/1
30 TABLET, FILM COATED, EXTENDED RELEASE ORAL
Qty: 30 TABLET | Refills: 1 | Status: SHIPPED | OUTPATIENT
Start: 2025-08-04 | End: 2025-10-03

## 2025-08-03 RX ADMIN — ACETAMINOPHEN 975 MG: 325 TABLET, FILM COATED ORAL at 16:17

## 2025-08-03 RX ADMIN — NIFEDIPINE 30 MG: 30 TABLET, FILM COATED, EXTENDED RELEASE ORAL at 06:06

## 2025-08-03 RX ADMIN — ACETAMINOPHEN 975 MG: 325 TABLET, FILM COATED ORAL at 09:01

## 2025-08-03 RX ADMIN — IBUPROFEN 600 MG: 600 TABLET ORAL at 09:01

## 2025-08-03 RX ADMIN — ENOXAPARIN SODIUM 40 MG: 100 INJECTION SUBCUTANEOUS at 09:01

## 2025-08-03 RX ADMIN — POLYETHYLENE GLYCOL 3350 17 G: 17 POWDER, FOR SOLUTION ORAL at 09:01

## 2025-08-03 RX ADMIN — IBUPROFEN 600 MG: 600 TABLET ORAL at 03:07

## 2025-08-03 RX ADMIN — IBUPROFEN 600 MG: 600 TABLET ORAL at 16:17

## 2025-08-03 RX ADMIN — ACETAMINOPHEN 975 MG: 325 TABLET, FILM COATED ORAL at 03:06

## 2025-08-03 ASSESSMENT — PAIN SCALES - GENERAL: PAINLEVEL_OUTOF10: 2

## 2025-08-03 ASSESSMENT — PAIN DESCRIPTION - DESCRIPTORS: DESCRIPTORS: SORE

## 2025-08-03 NOTE — DISCHARGE SUMMARY
Discharge Summary    Mayra Coronado is a 25 y.o. year old female , who delivered at 38w2d gestation via , Low Transverse in s/o arrest of active phase at 9cm that was cb PPH, EBL 1.2L requiring TXA, subsequently transferred from OSH for baby in NICU,  now PPD#3.    Admission Date: 2025  Discharge Date: 25       Discharge Diagnosis  , Low Transverse    Hospital Course  Delivery Date: 2025 11:53 AM  Delivery type: , Low Transverse   GA at delivery: 38w2d   Outcome: Living  Intrapartum complications: Failure to Progress in First Stage  Feeding method: Breastfeeding Status: Yes     Procedures: none  Contraception at discharge: Defers to PPV, interested in Paragard. We discussed pregnancy spacing of at least one year, abstaining from intercourse for 6wks, and the ability to become pregnant in the absence of regular menses. Pt verbalized understanding.    Lochia light. Pain well controlled. Denies HA, SOB, RUQ pain, vision changes.  Denies dizziness/lightheadedness, SOB, palpitations, extreme fatigue, heavy bleeding    Meeting all postpartum milestones. OK for DC today and follow up as below.   - Follow-up in 2-5 days for BP check  - Follow-up in 1-2wks for incision check  - Follow-up in 4-6wks with primary OGYN    Pertinent Physical Exam At Time of Discharge    General: Examination reveals a well developed, well nourished, female, in no acute distress. She is alert and cooperative.  HEENT: PERRLA. External ears normal. Nose normal, no erythema or discharge. Mouth and throat clear.  Lungs: clear to auscultation bilaterally.  Cardiac: regular rate and rhythm, S1, S2 normal, no murmur, click, rub or gallop.  Breasts: breasts appear normal for pregnancy, no suspicious masses, no skin or nipple changes or axillary nodes.  Abdomen: soft, gravid, nontender, nondistended, no abnormal masses, no epigastric pain.  Incision: healing well, no drainage, no swelling, well  "approximated.  Fundus: firm, below umbilicus, and nontender.  Perineum: well approximated and well healing.  Extremities: no redness or tenderness in the calves or thighs, no edema.  Neurological: alert, oriented, normal speech, no focal findings or movement disorder noted, DTRs normal and symmetrical.  Psychological: awake and alert; oriented to person, place, and time.    Vitals  BP (!) 139/91   Pulse 110   Temp 36.4 °C (97.5 °F) (Temporal)   Resp 18   Ht 1.626 m (5' 4\")   Wt 95.6 kg (210 lb 12.8 oz)   LMP 11/05/2024   SpO2 97%   Breastfeeding Yes   BMI 36.18 kg/m²      Discharge Meds     Your medication list        START taking these medications        Instructions Last Dose Given Next Dose Due   acetaminophen 325 mg tablet  Commonly known as: Tylenol      Take 3 tablets (975 mg) by mouth every 6 hours.       ibuprofen 600 mg tablet      Take 1 tablet (600 mg) by mouth every 6 hours.       NIFEdipine ER 30 mg 24 hr tablet  Commonly known as: Adalat CC  Start taking on: August 4, 2025      Take 1 tablet (30 mg) by mouth once daily in the morning. Take before meals. Do not crush, chew, or split.       polyethylene glycol 17 gram packet  Commonly known as: Glycolax, Miralax      Take 17 g by mouth 2 times a day as needed (first line).              CONTINUE taking these medications        Instructions Last Dose Given Next Dose Due   clobetasol 0.05 % ointment  Commonly known as: Temovate      Apply topically 2 times a day. Do not apply to face or neck.       DULoxetine 60 mg DR capsule  Commonly known as: Cymbalta           famotidine 20 mg tablet  Commonly known as: Pepcid           PRENATAL 19 ORAL           triamcinolone 0.1 % cream  Commonly known as: Kenalog      Apply topically 2 times a day.              STOP taking these medications      diphenhydrAMINE 25 mg capsule  Commonly known as: BENADryl                  Where to Get Your Medications        These medications were sent to [facility]  [address], [address] [address]      Hours: 8AM to 6PM Mon-Fri, 8AM to 4PM Sat, 9AM to 1PM Sun Phone: [phone]   NIFEdipine ER 30 mg 24 hr tablet       These medications were sent to [facility]  [address] [address]      Hours: 8:30 AM to 5 PM Mon-Fri Phone: [phone]   acetaminophen 325 mg tablet  ibuprofen 600 mg tablet  polyethylene glycol 17 gram packet          Complications Requiring Follow-Up    PEC w/o SF  - diagnosed by 2 mild range BPs > 4 hours apart and elevated P:C  - asymptomatic  - BP normotensive to mild range in pp period  - Current regimen nifed 30 mg daily, sent to pharmacy  - [facility] labs negative and P:C 0.33  -- The signs and symptoms of PEC were reviewed with the patient, including unrelenting headache, vision changes/blurred vision, and pain underneath the right breast.   - BP cuff for home for checking BP BID. Pt instructed to call primary OB if SBP > 160 or DBP > 110.    Anxiety/depression  -mood stable  -current regimen cymbalta 30 mg, patient states has meds at home    Test Results Pending At Discharge  Pending Labs       Order Current Status    Surgical Pathology Exam - [facility] In process            Outpatient Follow-Up    I spent 20 minutes in the professional and overall care of this patient.      MARK Hughes-CNP

## 2025-08-03 NOTE — CARE PLAN
Problem: Vaginal Birth or  Section  Goal: Minimal s/sx of HDP and BP<160/110  Outcome: Met     Problem: Pain - Adult  Goal: Verbalizes/displays adequate comfort level or baseline comfort level  Outcome: Met     Problem: Safety - Adult  Goal: Free from fall injury  Outcome: Met     Problem: Discharge Planning  Goal: Discharge to home or other facility with appropriate resources  Outcome: Met     Problem: Postpartum  Goal: Minimal s/sx of HDP and BP<160/110  Outcome: Met  Goal: Experiences normal postpartum course  Outcome: Met  Goal: Appropriate maternal -  bonding  Outcome: Met  Goal: Establish and maintain infant feeding pattern for adequate nutrition  Outcome: Met  Goal: Incisions, wounds, or drain sites healing without S/S of infection  Outcome: Met  Goal: No s/sx infection  Outcome: Met  Goal: No s/sx of hemorrhage  Outcome: Met   The patient's goals for the shift include continue to care for neborn    The clinical goals for the shift include Patients BP will remain < 160/110 during shift    Patient's BP has remained < 160/110 during shift. Patients pain has been adequately controlled and patient is free of any injuries. Discharge instructions given and reviewed with the patient, all questions answered.

## 2025-08-04 RX ORDER — LIDOCAINE HYDROCHLORIDE 10 MG/ML
INJECTION, SOLUTION EPIDURAL; INFILTRATION; INTRACAUDAL; PERINEURAL
Status: DISPENSED
Start: 2025-08-04 | End: 2025-08-05

## 2025-08-04 NOTE — LACTATION NOTE
This note was copied from a baby's chart.  Lactation Consultant Note  Lactation Consultation   Sameera Schmitt RN, IBCLC    Recommendations/Summary  Met with mother at infant's bedside. Provided with discharge education packet and reviewed it's contents of: outpatient lactation contact information, signs of adequate breastfeeding and latch, and causes for concern in the baby and mother. Offered to return and help with next feeding session. Answered any questions.

## 2025-08-06 ENCOUNTER — APPOINTMENT (OUTPATIENT)
Dept: OBSTETRICS AND GYNECOLOGY | Facility: CLINIC | Age: 25
End: 2025-08-06
Payer: MEDICAID

## 2025-08-06 LAB
LABORATORY COMMENT REPORT: NORMAL
PATH REPORT.FINAL DX SPEC: NORMAL
PATH REPORT.GROSS SPEC: NORMAL
PATH REPORT.RELEVANT HX SPEC: NORMAL
PATH REPORT.TOTAL CANCER: NORMAL

## 2025-08-07 ENCOUNTER — POSTPARTUM VISIT (OUTPATIENT)
Dept: OBSTETRICS AND GYNECOLOGY | Facility: CLINIC | Age: 25
End: 2025-08-07
Payer: MEDICAID

## 2025-08-07 ENCOUNTER — APPOINTMENT (OUTPATIENT)
Dept: OBSTETRICS AND GYNECOLOGY | Facility: CLINIC | Age: 25
End: 2025-08-07
Payer: MEDICAID

## 2025-08-07 VITALS
HEIGHT: 64 IN | DIASTOLIC BLOOD PRESSURE: 88 MMHG | WEIGHT: 181 LBS | BODY MASS INDEX: 30.9 KG/M2 | SYSTOLIC BLOOD PRESSURE: 133 MMHG

## 2025-08-07 DIAGNOSIS — O14.03 MILD PRE-ECLAMPSIA IN THIRD TRIMESTER (HHS-HCC): ICD-10-CM

## 2025-08-07 DIAGNOSIS — Z01.89 EARLY POSTNATAL HOSPITAL DISCHARGE ASSESSMENT: Primary | ICD-10-CM

## 2025-08-07 DIAGNOSIS — F41.9 ANXIETY: ICD-10-CM

## 2025-08-07 PROBLEM — O14.00 PRE-ECLAMPSIA, MILD (HHS-HCC): Status: RESOLVED | Noted: 2025-08-01 | Resolved: 2025-08-07

## 2025-08-07 PROBLEM — F32.A ANXIETY AND DEPRESSION: Status: RESOLVED | Noted: 2025-08-01 | Resolved: 2025-08-07

## 2025-08-07 PROBLEM — Z3A.38 38 WEEKS GESTATION OF PREGNANCY (HHS-HCC): Status: RESOLVED | Noted: 2025-07-30 | Resolved: 2025-08-07

## 2025-08-07 PROCEDURE — 99212 OFFICE O/P EST SF 10 MIN: CPT | Performed by: OBSTETRICS & GYNECOLOGY

## 2025-08-07 PROCEDURE — 0503F POSTPARTUM CARE VISIT: CPT | Performed by: OBSTETRICS & GYNECOLOGY

## 2025-08-07 RX ORDER — DULOXETIN HYDROCHLORIDE 60 MG/1
60 CAPSULE, DELAYED RELEASE ORAL DAILY
Qty: 90 CAPSULE | Refills: 1 | Status: SHIPPED | OUTPATIENT
Start: 2025-08-07

## 2025-08-07 ASSESSMENT — EDINBURGH POSTNATAL DEPRESSION SCALE (EPDS)
THE THOUGHT OF HARMING MYSELF HAS OCCURRED TO ME: NEVER
I HAVE BEEN ANXIOUS OR WORRIED FOR NO GOOD REASON: YES, SOMETIMES
THINGS HAVE BEEN GETTING ON TOP OF ME: YES, SOMETIMES I HAVEN'T BEEN COPING AS WELL AS USUAL
I HAVE FELT SAD OR MISERABLE: YES, QUITE OFTEN
I HAVE FELT SCARED OR PANICKY FOR NO GOOD REASON: YES, SOMETIMES
TOTAL SCORE: 15
I HAVE LOOKED FORWARD WITH ENJOYMENT TO THINGS: AS MUCH AS I EVER DID
I HAVE BLAMED MYSELF UNNECESSARILY WHEN THINGS WENT WRONG: YES, MOST OF THE TIME
I HAVE BEEN SO UNHAPPY THAT I HAVE BEEN CRYING: YES, QUITE OFTEN
I HAVE BEEN ABLE TO LAUGH AND SEE THE FUNNY SIDE OF THINGS: NOT QUITE SO MUCH NOW
I HAVE BEEN SO UNHAPPY THAT I HAVE HAD DIFFICULTY SLEEPING: NOT VERY OFTEN

## 2025-08-07 ASSESSMENT — PATIENT HEALTH QUESTIONNAIRE - PHQ9
SUM OF ALL RESPONSES TO PHQ9 QUESTIONS 1 & 2: 0
1. LITTLE INTEREST OR PLEASURE IN DOING THINGS: NOT AT ALL
2. FEELING DOWN, DEPRESSED OR HOPELESS: NOT AT ALL

## 2025-08-07 ASSESSMENT — ENCOUNTER SYMPTOMS
OCCASIONAL FEELINGS OF UNSTEADINESS: 0
DEPRESSION: 0
LOSS OF SENSATION IN FEET: 0

## 2025-08-07 ASSESSMENT — PAIN SCALES - GENERAL: PAINLEVEL_OUTOF10: 0-NO PAIN

## 2025-08-07 ASSESSMENT — LIFESTYLE VARIABLES: HOW MANY STANDARD DRINKS CONTAINING ALCOHOL DO YOU HAVE ON A TYPICAL DAY: PATIENT DOES NOT DRINK

## 2025-08-11 ENCOUNTER — APPOINTMENT (OUTPATIENT)
Dept: OBSTETRICS AND GYNECOLOGY | Facility: CLINIC | Age: 25
End: 2025-08-11
Payer: MEDICAID

## 2025-08-14 ENCOUNTER — APPOINTMENT (OUTPATIENT)
Dept: OBSTETRICS AND GYNECOLOGY | Facility: CLINIC | Age: 25
End: 2025-08-14
Payer: MEDICAID

## 2025-08-20 ENCOUNTER — LACTATION CONSULT (OUTPATIENT)
Dept: LACTATION | Facility: CLINIC | Age: 25
End: 2025-08-20
Payer: MEDICAID

## 2025-08-20 DIAGNOSIS — O92.70 LACTATION DISORDER (HHS-HCC): Primary | ICD-10-CM

## 2025-08-20 PROCEDURE — 99211 OFF/OP EST MAY X REQ PHY/QHP: CPT | Performed by: OBSTETRICS & GYNECOLOGY

## 2025-08-20 ASSESSMENT — ENCOUNTER SYMPTOMS
OCCASIONAL FEELINGS OF UNSTEADINESS: 0
LOSS OF SENSATION IN FEET: 0
DEPRESSION: 0

## 2025-08-21 ENCOUNTER — TELEPHONE (OUTPATIENT)
Dept: LACTATION | Facility: CLINIC | Age: 25
End: 2025-08-21
Payer: MEDICAID

## 2025-08-25 ENCOUNTER — POSTPARTUM VISIT (OUTPATIENT)
Dept: OBSTETRICS AND GYNECOLOGY | Facility: CLINIC | Age: 25
End: 2025-08-25
Payer: MEDICAID

## 2025-08-25 VITALS
WEIGHT: 171 LBS | DIASTOLIC BLOOD PRESSURE: 72 MMHG | BODY MASS INDEX: 29.19 KG/M2 | HEIGHT: 64 IN | SYSTOLIC BLOOD PRESSURE: 105 MMHG

## 2025-08-25 DIAGNOSIS — O14.03 MILD PRE-ECLAMPSIA IN THIRD TRIMESTER (HHS-HCC): ICD-10-CM

## 2025-08-25 DIAGNOSIS — F41.9 ANXIETY: ICD-10-CM

## 2025-08-25 DIAGNOSIS — Z01.89 EARLY POSTNATAL HOSPITAL DISCHARGE ASSESSMENT: Primary | ICD-10-CM

## 2025-08-25 PROCEDURE — 99212 OFFICE O/P EST SF 10 MIN: CPT

## 2025-08-25 PROCEDURE — 0503F POSTPARTUM CARE VISIT: CPT | Performed by: OBSTETRICS & GYNECOLOGY

## 2025-08-25 ASSESSMENT — EDINBURGH POSTNATAL DEPRESSION SCALE (EPDS)
I HAVE BEEN ANXIOUS OR WORRIED FOR NO GOOD REASON: YES, SOMETIMES
TOTAL SCORE: 8
THE THOUGHT OF HARMING MYSELF HAS OCCURRED TO ME: NEVER
I HAVE BLAMED MYSELF UNNECESSARILY WHEN THINGS WENT WRONG: YES, SOME OF THE TIME
I HAVE BEEN ABLE TO LAUGH AND SEE THE FUNNY SIDE OF THINGS: AS MUCH AS I ALWAYS COULD
I HAVE FELT SCARED OR PANICKY FOR NO GOOD REASON: NO, NOT MUCH
I HAVE FELT SAD OR MISERABLE: NOT VERY OFTEN
THINGS HAVE BEEN GETTING ON TOP OF ME: NO, MOST OF THE TIME I HAVE COPED QUITE WELL
I HAVE BEEN SO UNHAPPY THAT I HAVE BEEN CRYING: ONLY OCCASIONALLY
I HAVE BEEN SO UNHAPPY THAT I HAVE HAD DIFFICULTY SLEEPING: NOT AT ALL
I HAVE LOOKED FORWARD WITH ENJOYMENT TO THINGS: AS MUCH AS I EVER DID

## 2025-08-25 ASSESSMENT — LIFESTYLE VARIABLES: HOW MANY STANDARD DRINKS CONTAINING ALCOHOL DO YOU HAVE ON A TYPICAL DAY: PATIENT DOES NOT DRINK

## 2025-08-25 ASSESSMENT — PAIN SCALES - GENERAL: PAINLEVEL_OUTOF10: 0-NO PAIN

## 2025-09-01 ENCOUNTER — TELEPHONE (OUTPATIENT)
Dept: OBSTETRICS AND GYNECOLOGY | Facility: HOSPITAL | Age: 25
End: 2025-09-01
Payer: MEDICAID

## 2025-09-03 ENCOUNTER — POSTPARTUM VISIT (OUTPATIENT)
Dept: OBSTETRICS AND GYNECOLOGY | Facility: CLINIC | Age: 25
End: 2025-09-03
Payer: MEDICAID

## 2025-09-03 VITALS
HEIGHT: 64 IN | DIASTOLIC BLOOD PRESSURE: 81 MMHG | WEIGHT: 169 LBS | BODY MASS INDEX: 28.85 KG/M2 | SYSTOLIC BLOOD PRESSURE: 123 MMHG

## 2025-09-03 PROCEDURE — 99212 OFFICE O/P EST SF 10 MIN: CPT

## 2025-09-03 ASSESSMENT — EDINBURGH POSTNATAL DEPRESSION SCALE (EPDS)
I HAVE BEEN SO UNHAPPY THAT I HAVE BEEN CRYING: ONLY OCCASIONALLY
I HAVE FELT SCARED OR PANICKY FOR NO GOOD REASON: YES, SOMETIMES
THE THOUGHT OF HARMING MYSELF HAS OCCURRED TO ME: NEVER
I HAVE BEEN ABLE TO LAUGH AND SEE THE FUNNY SIDE OF THINGS: AS MUCH AS I ALWAYS COULD
I HAVE BEEN ANXIOUS OR WORRIED FOR NO GOOD REASON: YES, SOMETIMES
I HAVE LOOKED FORWARD WITH ENJOYMENT TO THINGS: AS MUCH AS I EVER DID
TOTAL SCORE: 9
THINGS HAVE BEEN GETTING ON TOP OF ME: NO, MOST OF THE TIME I HAVE COPED QUITE WELL
I HAVE BEEN SO UNHAPPY THAT I HAVE HAD DIFFICULTY SLEEPING: NOT AT ALL
I HAVE BLAMED MYSELF UNNECESSARILY WHEN THINGS WENT WRONG: YES, SOME OF THE TIME
I HAVE FELT SAD OR MISERABLE: NOT VERY OFTEN

## 2025-09-03 ASSESSMENT — ENCOUNTER SYMPTOMS
LOSS OF SENSATION IN FEET: 0
OCCASIONAL FEELINGS OF UNSTEADINESS: 0
DEPRESSION: 0

## 2025-09-03 ASSESSMENT — LIFESTYLE VARIABLES: HOW MANY STANDARD DRINKS CONTAINING ALCOHOL DO YOU HAVE ON A TYPICAL DAY: PATIENT DOES NOT DRINK

## 2025-09-04 ENCOUNTER — APPOINTMENT (OUTPATIENT)
Dept: OBSTETRICS AND GYNECOLOGY | Facility: CLINIC | Age: 25
End: 2025-09-04
Payer: MEDICAID

## (undated) DEVICE — PREP TRAY, VAGINAL

## (undated) DEVICE — Device

## (undated) DEVICE — GLOVE, SURGICAL, PROTEXIS PI , 6.5, PF, LF

## (undated) DEVICE — SLEEVE, VASO PRESS, CALF GARMENT, MEDIUM, GREEN

## (undated) DEVICE — CAUTERY, PENCIL, PUSH BUTTON, SMOKE EVAC, 70MM

## (undated) DEVICE — SUTURE, VICRYL 0, 36 IN, CT-1, VIOLET

## (undated) DEVICE — SUTURE, MONOCRYL, 4-0, 27 IN, PS-2, UNDYED

## (undated) DEVICE — PAD, GROUNDING, ELECTROSURGICAL, W/9 FT CABLE, POLYHESIVE II, ADULT, LF